# Patient Record
Sex: FEMALE | Race: WHITE | URBAN - METROPOLITAN AREA
[De-identification: names, ages, dates, MRNs, and addresses within clinical notes are randomized per-mention and may not be internally consistent; named-entity substitution may affect disease eponyms.]

---

## 2024-01-01 ENCOUNTER — APPOINTMENT (OUTPATIENT)
Dept: GENERAL RADIOLOGY | Age: 54
DRG: 064 | End: 2024-01-01
Payer: COMMERCIAL

## 2024-01-01 ENCOUNTER — APPOINTMENT (OUTPATIENT)
Dept: CT IMAGING | Age: 54
DRG: 064 | End: 2024-01-01
Payer: COMMERCIAL

## 2024-01-01 ENCOUNTER — HOSPITAL ENCOUNTER (INPATIENT)
Age: 54
LOS: 5 days | DRG: 064 | End: 2024-02-26
Attending: EMERGENCY MEDICINE | Admitting: SURGERY
Payer: COMMERCIAL

## 2024-01-01 ENCOUNTER — APPOINTMENT (OUTPATIENT)
Age: 54
DRG: 064 | End: 2024-01-01
Attending: SURGERY
Payer: COMMERCIAL

## 2024-01-01 VITALS
WEIGHT: 260.14 LBS | HEIGHT: 64 IN | DIASTOLIC BLOOD PRESSURE: 53 MMHG | HEART RATE: 71 BPM | RESPIRATION RATE: 14 BRPM | OXYGEN SATURATION: 100 % | TEMPERATURE: 98.2 F | SYSTOLIC BLOOD PRESSURE: 95 MMHG | BODY MASS INDEX: 44.41 KG/M2

## 2024-01-01 DIAGNOSIS — I61.9 INTRAPARENCHYMAL HEMORRHAGE OF BRAIN (HCC): Primary | ICD-10-CM

## 2024-01-01 LAB
AADO2: 329 MMHG
AADO2: 345.2 MMHG
AADO2: 346.5 MMHG
AADO2: 350.6 MMHG
AADO2: 360 MMHG
AADO2: 360.3 MMHG
AADO2: 361.7 MMHG
AADO2: 376.7 MMHG
AADO2: 380 MMHG
AADO2: 382.3 MMHG
AADO2: 386.4 MMHG
AADO2: 387.2 MMHG
AADO2: 409.5 MMHG
AADO2: 410.7 MMHG
AADO2: 421.2 MMHG
AADO2: 424.3 MMHG
AADO2: 425.1 MMHG
AADO2: 427.8 MMHG
AADO2: 438.9 MMHG
AADO2: 442.2 MMHG
AADO2: 458.3 MMHG
AADO2: 472.7 MMHG
AADO2: 552.1 MMHG
AADO2: 553.8 MMHG
AADO2: 56.3 MMHG
ABO + RH BLD: NORMAL
ABO + RH BLD: NORMAL
ALBUMIN SERPL-MCNC: 2.6 G/DL (ref 3.5–5.2)
ALBUMIN SERPL-MCNC: 2.8 G/DL (ref 3.5–5.2)
ALBUMIN SERPL-MCNC: 2.9 G/DL (ref 3.5–5.2)
ALBUMIN SERPL-MCNC: 3 G/DL (ref 3.5–5.2)
ALBUMIN SERPL-MCNC: 3 G/DL (ref 3.5–5.2)
ALBUMIN SERPL-MCNC: 3.2 G/DL (ref 3.5–5.2)
ALBUMIN SERPL-MCNC: 4.4 G/DL (ref 3.5–5.2)
ALP SERPL-CCNC: 49 U/L (ref 35–104)
ALP SERPL-CCNC: 50 U/L (ref 35–104)
ALP SERPL-CCNC: 52 U/L (ref 35–104)
ALP SERPL-CCNC: 56 U/L (ref 35–104)
ALP SERPL-CCNC: 58 U/L (ref 35–104)
ALP SERPL-CCNC: 59 U/L (ref 35–104)
ALP SERPL-CCNC: 73 U/L (ref 35–104)
ALP SERPL-CCNC: 82 U/L (ref 35–104)
ALP SERPL-CCNC: 85 U/L (ref 35–104)
ALP SERPL-CCNC: 97 U/L (ref 35–104)
ALT SERPL-CCNC: 16 U/L (ref 0–32)
ALT SERPL-CCNC: 17 U/L (ref 0–32)
ALT SERPL-CCNC: 17 U/L (ref 0–32)
ALT SERPL-CCNC: 19 U/L (ref 0–32)
ALT SERPL-CCNC: 20 U/L (ref 0–32)
ALT SERPL-CCNC: 7 U/L (ref 0–32)
AMYLASE SERPL-CCNC: 180 U/L (ref 20–100)
AMYLASE SERPL-CCNC: 322 U/L (ref 20–100)
AMYLASE SERPL-CCNC: 801 U/L (ref 20–100)
AMYLASE SERPL-CCNC: 96 U/L (ref 20–100)
ANION GAP SERPL CALCULATED.3IONS-SCNC: 10 MMOL/L (ref 7–16)
ANION GAP SERPL CALCULATED.3IONS-SCNC: 11 MMOL/L (ref 7–16)
ANION GAP SERPL CALCULATED.3IONS-SCNC: 11 MMOL/L (ref 7–16)
ANION GAP SERPL CALCULATED.3IONS-SCNC: 12 MMOL/L (ref 7–16)
ANION GAP SERPL CALCULATED.3IONS-SCNC: 13 MMOL/L (ref 7–16)
ANION GAP SERPL CALCULATED.3IONS-SCNC: 5 MMOL/L (ref 7–16)
ANION GAP SERPL CALCULATED.3IONS-SCNC: 7 MMOL/L (ref 7–16)
ANION GAP SERPL CALCULATED.3IONS-SCNC: 8 MMOL/L (ref 7–16)
ANION GAP SERPL CALCULATED.3IONS-SCNC: 9 MMOL/L (ref 7–16)
APAP SERPL-MCNC: <5 UG/ML (ref 10–30)
ARM BAND NUMBER: NORMAL
ARM BAND NUMBER: NORMAL
AST SERPL-CCNC: 14 U/L (ref 0–31)
AST SERPL-CCNC: 15 U/L (ref 0–31)
AST SERPL-CCNC: 16 U/L (ref 0–31)
AST SERPL-CCNC: 18 U/L (ref 0–31)
AST SERPL-CCNC: 18 U/L (ref 0–31)
AST SERPL-CCNC: 20 U/L (ref 0–31)
AST SERPL-CCNC: 21 U/L (ref 0–31)
AST SERPL-CCNC: 22 U/L (ref 0–31)
AST SERPL-CCNC: 23 U/L (ref 0–31)
AST SERPL-CCNC: 23 U/L (ref 0–31)
B.E.: -0.8 MMOL/L (ref -3–3)
B.E.: -1.9 MMOL/L (ref -3–3)
B.E.: -2.3 MMOL/L (ref -3–3)
B.E.: -2.6 MMOL/L (ref -3–3)
B.E.: -3.3 MMOL/L (ref -3–3)
B.E.: -3.4 MMOL/L (ref -3–3)
B.E.: -3.4 MMOL/L (ref -3–3)
B.E.: -3.5 MMOL/L (ref -3–3)
B.E.: -3.5 MMOL/L (ref -3–3)
B.E.: -3.8 MMOL/L (ref -3–3)
B.E.: -4.1 MMOL/L (ref -3–3)
B.E.: -4.2 MMOL/L (ref -3–3)
B.E.: -4.3 MMOL/L (ref -3–3)
B.E.: -4.5 MMOL/L (ref -3–3)
B.E.: -4.7 MMOL/L (ref -3–3)
B.E.: -4.7 MMOL/L (ref -3–3)
B.E.: -5.5 MMOL/L (ref -3–3)
B.E.: -5.5 MMOL/L (ref -3–3)
B.E.: -6 MMOL/L (ref -3–3)
B.E.: -6.1 MMOL/L (ref -3–3)
B.E.: -6.1 MMOL/L (ref -3–3)
B.E.: -6.4 MMOL/L (ref -3–3)
B.E.: -7.1 MMOL/L (ref -3–3)
BACTERIA URNS QL MICRO: ABNORMAL
BASOPHILS # BLD: 0 K/UL (ref 0–0.2)
BASOPHILS # BLD: 0.01 K/UL (ref 0–0.2)
BASOPHILS # BLD: 0.02 K/UL (ref 0–0.2)
BASOPHILS # BLD: 0.05 K/UL (ref 0–0.2)
BASOPHILS NFR BLD: 0 % (ref 0–2)
BASOPHILS NFR BLD: 1 % (ref 0–2)
BILIRUB DIRECT SERPL-MCNC: <0.2 MG/DL (ref 0–0.3)
BILIRUB INDIRECT SERPL-MCNC: ABNORMAL MG/DL (ref 0–1)
BILIRUB SERPL-MCNC: 0.2 MG/DL (ref 0–1.2)
BILIRUB SERPL-MCNC: 0.3 MG/DL (ref 0–1.2)
BILIRUB SERPL-MCNC: 0.3 MG/DL (ref 0–1.2)
BILIRUB SERPL-MCNC: 0.4 MG/DL (ref 0–1.2)
BILIRUB UR QL STRIP: NEGATIVE
BLOOD BANK SAMPLE EXPIRATION: NORMAL
BLOOD BANK SAMPLE EXPIRATION: NORMAL
BLOOD GROUP ANTIBODIES SERPL: NEGATIVE
BLOOD GROUP ANTIBODIES SERPL: NEGATIVE
BUN SERPL-MCNC: 19 MG/DL (ref 6–20)
BUN SERPL-MCNC: 20 MG/DL (ref 6–20)
BUN SERPL-MCNC: 21 MG/DL (ref 6–20)
BUN SERPL-MCNC: 22 MG/DL (ref 6–20)
BUN SERPL-MCNC: 22 MG/DL (ref 6–20)
BUN SERPL-MCNC: 23 MG/DL (ref 6–20)
BUN SERPL-MCNC: 24 MG/DL (ref 6–20)
BUN SERPL-MCNC: 25 MG/DL (ref 6–20)
BUN SERPL-MCNC: 26 MG/DL (ref 6–20)
BUN SERPL-MCNC: 27 MG/DL (ref 6–20)
BUN SERPL-MCNC: 28 MG/DL (ref 6–20)
BUN SERPL-MCNC: 29 MG/DL (ref 6–20)
BUN SERPL-MCNC: 29 MG/DL (ref 6–20)
CA-I BLD-SCNC: 1 MMOL/L (ref 1.15–1.33)
CA-I BLD-SCNC: 1.14 MMOL/L (ref 1.15–1.33)
CA-I BLD-SCNC: 1.16 MMOL/L (ref 1.15–1.33)
CA-I BLD-SCNC: 1.16 MMOL/L (ref 1.15–1.33)
CA-I BLD-SCNC: 1.17 MMOL/L (ref 1.15–1.33)
CA-I BLD-SCNC: 1.18 MMOL/L (ref 1.15–1.33)
CA-I BLD-SCNC: 1.18 MMOL/L (ref 1.15–1.33)
CA-I BLD-SCNC: 1.19 MMOL/L (ref 1.15–1.33)
CA-I BLD-SCNC: 1.21 MMOL/L (ref 1.15–1.33)
CA-I BLD-SCNC: 1.23 MMOL/L (ref 1.15–1.33)
CA-I BLD-SCNC: 1.23 MMOL/L (ref 1.15–1.33)
CA-I BLD-SCNC: 1.24 MMOL/L (ref 1.15–1.33)
CALCIUM SERPL-MCNC: 7.8 MG/DL (ref 8.6–10.2)
CALCIUM SERPL-MCNC: 7.9 MG/DL (ref 8.6–10.2)
CALCIUM SERPL-MCNC: 8 MG/DL (ref 8.6–10.2)
CALCIUM SERPL-MCNC: 8.1 MG/DL (ref 8.6–10.2)
CALCIUM SERPL-MCNC: 8.2 MG/DL (ref 8.6–10.2)
CALCIUM SERPL-MCNC: 8.2 MG/DL (ref 8.6–10.2)
CALCIUM SERPL-MCNC: 8.3 MG/DL (ref 8.6–10.2)
CALCIUM SERPL-MCNC: 8.4 MG/DL (ref 8.6–10.2)
CALCIUM SERPL-MCNC: 8.6 MG/DL (ref 8.6–10.2)
CALCIUM SERPL-MCNC: 9 MG/DL (ref 8.6–10.2)
CASTS #/AREA URNS LPF: ABNORMAL /LPF
CHLORIDE SERPL-SCNC: 103 MMOL/L (ref 98–107)
CHLORIDE SERPL-SCNC: 112 MMOL/L (ref 98–107)
CHLORIDE SERPL-SCNC: 116 MMOL/L (ref 98–107)
CHLORIDE SERPL-SCNC: 118 MMOL/L (ref 98–107)
CHLORIDE SERPL-SCNC: 120 MMOL/L (ref 98–107)
CHLORIDE SERPL-SCNC: 122 MMOL/L (ref 98–107)
CHLORIDE SERPL-SCNC: 124 MMOL/L (ref 98–107)
CHLORIDE SERPL-SCNC: 126 MMOL/L (ref 98–107)
CHLORIDE SERPL-SCNC: 127 MMOL/L (ref 98–107)
CHLORIDE SERPL-SCNC: 130 MMOL/L (ref 98–107)
CHLORIDE SERPL-SCNC: 130 MMOL/L (ref 98–107)
CHLORIDE SERPL-SCNC: 132 MMOL/L (ref 98–107)
CHLORIDE SERPL-SCNC: 133 MMOL/L (ref 98–107)
CHLORIDE SERPL-SCNC: 134 MMOL/L (ref 98–107)
CHLORIDE SERPL-SCNC: 136 MMOL/L (ref 98–107)
CHLORIDE SERPL-SCNC: 137 MMOL/L (ref 98–107)
CK SERPL-CCNC: 272 U/L (ref 20–180)
CLARITY UR: ABNORMAL
CLARITY UR: ABNORMAL
CLARITY UR: CLEAR
CLARITY UR: CLEAR
CLOT ANGLE.KAOLIN INDUCED BLD RES TEG: 74.4 DEG (ref 53–70)
CO2 SERPL-SCNC: 18 MMOL/L (ref 22–29)
CO2 SERPL-SCNC: 19 MMOL/L (ref 22–29)
CO2 SERPL-SCNC: 20 MMOL/L (ref 22–29)
CO2 SERPL-SCNC: 21 MMOL/L (ref 22–29)
CO2 SERPL-SCNC: 22 MMOL/L (ref 22–29)
CO2 SERPL-SCNC: 22 MMOL/L (ref 22–29)
CO2 SERPL-SCNC: 23 MMOL/L (ref 22–29)
CO2 SERPL-SCNC: 24 MMOL/L (ref 22–29)
COHB: 0.2 % (ref 0–1.5)
COHB: 0.3 % (ref 0–1.5)
COHB: 0.7 % (ref 0–1.5)
COLOR UR: YELLOW
COMMENT: ABNORMAL
CORTIS SERPL-MCNC: 10 UG/DL (ref 2.7–18.4)
CORTISOL COLLECTION INFO: NORMAL
CREAT SERPL-MCNC: 0.9 MG/DL (ref 0.5–1)
CREAT SERPL-MCNC: 0.9 MG/DL (ref 0.5–1)
CREAT SERPL-MCNC: 1.1 MG/DL (ref 0.5–1)
CREAT SERPL-MCNC: 1.2 MG/DL (ref 0.5–1)
CREAT SERPL-MCNC: 1.3 MG/DL (ref 0.5–1)
CRITICAL: ABNORMAL
DATE ANALYZED: ABNORMAL
DATE OF COLLECTION: ABNORMAL
ECHO AO ASC DIAM: 2.9 CM
ECHO AO ASCENDING AORTA INDEX: 1.32 CM/M2
ECHO AV AREA PEAK VELOCITY: 3.7 CM2
ECHO AV AREA VTI: 3.7 CM2
ECHO AV AREA/BSA PEAK VELOCITY: 1.7 CM2/M2
ECHO AV AREA/BSA VTI: 1.7 CM2/M2
ECHO AV CUSP MM: 1.8 CM
ECHO AV MEAN GRADIENT: 15 MMHG
ECHO AV MEAN VELOCITY: 1.9 M/S
ECHO AV PEAK GRADIENT: 24 MMHG
ECHO AV PEAK VELOCITY: 2.4 M/S
ECHO AV VELOCITY RATIO: 1.04
ECHO AV VTI: 38.3 CM
ECHO BSA: 2.23 M2
ECHO EST RA PRESSURE: 3 MMHG
ECHO LA DIAMETER INDEX: 1.87 CM/M2
ECHO LA DIAMETER: 4.1 CM
ECHO LA VOL A-L A2C: 54 ML (ref 22–52)
ECHO LA VOL A-L A4C: 47 ML (ref 22–52)
ECHO LA VOL MOD A2C: 51 ML (ref 22–52)
ECHO LA VOL MOD A4C: 44 ML (ref 22–52)
ECHO LA VOLUME AREA LENGTH: 50 ML
ECHO LA VOLUME INDEX A-L A2C: 25 ML/M2 (ref 16–34)
ECHO LA VOLUME INDEX A-L A4C: 21 ML/M2 (ref 16–34)
ECHO LA VOLUME INDEX AREA LENGTH: 23 ML/M2 (ref 16–34)
ECHO LA VOLUME INDEX MOD A2C: 23 ML/M2 (ref 16–34)
ECHO LA VOLUME INDEX MOD A4C: 20 ML/M2 (ref 16–34)
ECHO LV FRACTIONAL SHORTENING: 38 % (ref 28–44)
ECHO LV INTERNAL DIMENSION DIASTOLE INDEX: 1.92 CM/M2
ECHO LV INTERNAL DIMENSION DIASTOLIC: 4.2 CM (ref 3.9–5.3)
ECHO LV INTERNAL DIMENSION SYSTOLIC INDEX: 1.19 CM/M2
ECHO LV INTERNAL DIMENSION SYSTOLIC: 2.6 CM
ECHO LV IVSD: 1.8 CM (ref 0.6–0.9)
ECHO LV MASS 2D: 290 G (ref 67–162)
ECHO LV MASS INDEX 2D: 132.4 G/M2 (ref 43–95)
ECHO LV POSTERIOR WALL DIASTOLIC: 1.5 CM (ref 0.6–0.9)
ECHO LV RELATIVE WALL THICKNESS RATIO: 0.71
ECHO LVOT AREA: 3.8 CM2
ECHO LVOT AV VTI INDEX: 0.99
ECHO LVOT DIAM: 2.2 CM
ECHO LVOT MEAN GRADIENT: 13 MMHG
ECHO LVOT PEAK GRADIENT: 24 MMHG
ECHO LVOT PEAK VELOCITY: 2.5 M/S
ECHO LVOT STROKE VOLUME INDEX: 66.1 ML/M2
ECHO LVOT SV: 144.8 ML
ECHO LVOT VTI: 38.1 CM
ECHO MV "A" WAVE DURATION: 110.7 MSEC
ECHO MV A VELOCITY: 0.86 M/S
ECHO MV AREA PHT: 3.5 CM2
ECHO MV AREA VTI: 4.2 CM2
ECHO MV E DECELERATION TIME (DT): 202.3 MS
ECHO MV E VELOCITY: 0.83 M/S
ECHO MV E/A RATIO: 0.97
ECHO MV LVOT VTI INDEX: 0.91
ECHO MV MAX VELOCITY: 1.4 M/S
ECHO MV MEAN GRADIENT: 3 MMHG
ECHO MV MEAN VELOCITY: 0.9 M/S
ECHO MV PEAK GRADIENT: 7 MMHG
ECHO MV PRESSURE HALF TIME (PHT): 62.6 MS
ECHO MV VTI: 34.8 CM
ECHO RIGHT VENTRICULAR SYSTOLIC PRESSURE (RVSP): 49 MMHG
ECHO RV INTERNAL DIMENSION: 2.9 CM
ECHO TV REGURGITANT MAX VELOCITY: 3.39 M/S
ECHO TV REGURGITANT PEAK GRADIENT: 46 MMHG
EOSINOPHIL # BLD: 0 K/UL (ref 0.05–0.5)
EOSINOPHIL # BLD: 0.02 K/UL (ref 0.05–0.5)
EOSINOPHIL # BLD: 0.06 K/UL (ref 0.05–0.5)
EOSINOPHIL # BLD: 0.09 K/UL (ref 0.05–0.5)
EOSINOPHIL # BLD: 0.11 K/UL (ref 0.05–0.5)
EOSINOPHIL # BLD: 0.12 K/UL (ref 0.05–0.5)
EOSINOPHIL # BLD: 0.12 K/UL (ref 0.05–0.5)
EOSINOPHIL # BLD: 0.16 K/UL (ref 0.05–0.5)
EOSINOPHIL # BLD: 0.19 K/UL (ref 0.05–0.5)
EOSINOPHILS RELATIVE PERCENT: 0 % (ref 0–6)
EOSINOPHILS RELATIVE PERCENT: 1 % (ref 0–6)
EOSINOPHILS RELATIVE PERCENT: 1 % (ref 0–6)
EOSINOPHILS RELATIVE PERCENT: 2 % (ref 0–6)
EPL-TEG: 0 % (ref 0–15)
ERYTHROCYTE [DISTWIDTH] IN BLOOD BY AUTOMATED COUNT: 14.6 % (ref 11.5–15)
ERYTHROCYTE [DISTWIDTH] IN BLOOD BY AUTOMATED COUNT: 15 % (ref 11.5–15)
ERYTHROCYTE [DISTWIDTH] IN BLOOD BY AUTOMATED COUNT: 15.8 % (ref 11.5–15)
ERYTHROCYTE [DISTWIDTH] IN BLOOD BY AUTOMATED COUNT: 16.1 % (ref 11.5–15)
ERYTHROCYTE [DISTWIDTH] IN BLOOD BY AUTOMATED COUNT: 16.1 % (ref 11.5–15)
ERYTHROCYTE [DISTWIDTH] IN BLOOD BY AUTOMATED COUNT: 16.4 % (ref 11.5–15)
ERYTHROCYTE [DISTWIDTH] IN BLOOD BY AUTOMATED COUNT: 16.6 % (ref 11.5–15)
ERYTHROCYTE [DISTWIDTH] IN BLOOD BY AUTOMATED COUNT: 16.8 % (ref 11.5–15)
ERYTHROCYTE [DISTWIDTH] IN BLOOD BY AUTOMATED COUNT: 16.9 % (ref 11.5–15)
ERYTHROCYTE [DISTWIDTH] IN BLOOD BY AUTOMATED COUNT: 17 % (ref 11.5–15)
ERYTHROCYTE [DISTWIDTH] IN BLOOD BY AUTOMATED COUNT: 17.1 % (ref 11.5–15)
ERYTHROCYTE [DISTWIDTH] IN BLOOD BY AUTOMATED COUNT: 17.1 % (ref 11.5–15)
ETHANOLAMINE SERPL-MCNC: <10 MG/DL
FIO2: 100 %
FIO2: 40 %
FIO2: 80 %
FIO2: 90 %
G-TEG: 15.1 KDYN/CM2 (ref 4.5–11)
GFR SERPL CREATININE-BSD FRML MDRD: 50 ML/MIN/1.73M2
GFR SERPL CREATININE-BSD FRML MDRD: 51 ML/MIN/1.73M2
GFR SERPL CREATININE-BSD FRML MDRD: 52 ML/MIN/1.73M2
GFR SERPL CREATININE-BSD FRML MDRD: 53 ML/MIN/1.73M2
GFR SERPL CREATININE-BSD FRML MDRD: 53 ML/MIN/1.73M2
GFR SERPL CREATININE-BSD FRML MDRD: 55 ML/MIN/1.73M2
GFR SERPL CREATININE-BSD FRML MDRD: 56 ML/MIN/1.73M2
GFR SERPL CREATININE-BSD FRML MDRD: 57 ML/MIN/1.73M2
GFR SERPL CREATININE-BSD FRML MDRD: 58 ML/MIN/1.73M2
GFR SERPL CREATININE-BSD FRML MDRD: 58 ML/MIN/1.73M2
GFR SERPL CREATININE-BSD FRML MDRD: 59 ML/MIN/1.73M2
GFR SERPL CREATININE-BSD FRML MDRD: 60 ML/MIN/1.73M2
GFR SERPL CREATININE-BSD FRML MDRD: >60 ML/MIN/1.73M2
GLUCOSE SERPL-MCNC: 110 MG/DL (ref 74–99)
GLUCOSE SERPL-MCNC: 113 MG/DL (ref 74–99)
GLUCOSE SERPL-MCNC: 115 MG/DL (ref 74–99)
GLUCOSE SERPL-MCNC: 118 MG/DL (ref 74–99)
GLUCOSE SERPL-MCNC: 120 MG/DL (ref 74–99)
GLUCOSE SERPL-MCNC: 122 MG/DL (ref 74–99)
GLUCOSE SERPL-MCNC: 126 MG/DL (ref 74–99)
GLUCOSE SERPL-MCNC: 129 MG/DL (ref 74–99)
GLUCOSE SERPL-MCNC: 129 MG/DL (ref 74–99)
GLUCOSE SERPL-MCNC: 130 MG/DL (ref 74–99)
GLUCOSE SERPL-MCNC: 130 MG/DL (ref 74–99)
GLUCOSE SERPL-MCNC: 132 MG/DL (ref 74–99)
GLUCOSE SERPL-MCNC: 132 MG/DL (ref 74–99)
GLUCOSE SERPL-MCNC: 134 MG/DL (ref 74–99)
GLUCOSE SERPL-MCNC: 136 MG/DL (ref 74–99)
GLUCOSE SERPL-MCNC: 137 MG/DL (ref 74–99)
GLUCOSE SERPL-MCNC: 138 MG/DL (ref 74–99)
GLUCOSE SERPL-MCNC: 138 MG/DL (ref 74–99)
GLUCOSE SERPL-MCNC: 143 MG/DL (ref 74–99)
GLUCOSE SERPL-MCNC: 145 MG/DL (ref 74–99)
GLUCOSE SERPL-MCNC: 160 MG/DL (ref 74–99)
GLUCOSE UR STRIP-MCNC: NEGATIVE MG/DL
HBA1C MFR BLD: 5.6 % (ref 4–5.6)
HCO3: 18.5 MMOL/L (ref 22–26)
HCO3: 18.8 MMOL/L (ref 22–26)
HCO3: 19 MMOL/L (ref 22–26)
HCO3: 19.4 MMOL/L (ref 22–26)
HCO3: 19.4 MMOL/L (ref 22–26)
HCO3: 19.5 MMOL/L (ref 22–26)
HCO3: 19.6 MMOL/L (ref 22–26)
HCO3: 19.8 MMOL/L (ref 22–26)
HCO3: 19.8 MMOL/L (ref 22–26)
HCO3: 20 MMOL/L (ref 22–26)
HCO3: 20 MMOL/L (ref 22–26)
HCO3: 20.2 MMOL/L (ref 22–26)
HCO3: 20.2 MMOL/L (ref 22–26)
HCO3: 20.3 MMOL/L (ref 22–26)
HCO3: 20.8 MMOL/L (ref 22–26)
HCO3: 21.2 MMOL/L (ref 22–26)
HCO3: 21.2 MMOL/L (ref 22–26)
HCO3: 21.8 MMOL/L (ref 22–26)
HCO3: 21.8 MMOL/L (ref 22–26)
HCO3: 22.2 MMOL/L (ref 22–26)
HCO3: 22.2 MMOL/L (ref 22–26)
HCO3: 22.8 MMOL/L (ref 22–26)
HCO3: 22.8 MMOL/L (ref 22–26)
HCO3: 23.3 MMOL/L (ref 22–26)
HCO3: 24.2 MMOL/L (ref 22–26)
HCT VFR BLD AUTO: 25.6 % (ref 34–48)
HCT VFR BLD AUTO: 25.9 % (ref 34–48)
HCT VFR BLD AUTO: 26.3 % (ref 34–48)
HCT VFR BLD AUTO: 27.2 % (ref 34–48)
HCT VFR BLD AUTO: 28.3 % (ref 34–48)
HCT VFR BLD AUTO: 29.1 % (ref 34–48)
HCT VFR BLD AUTO: 29.6 % (ref 34–48)
HCT VFR BLD AUTO: 30.7 % (ref 34–48)
HCT VFR BLD AUTO: 32.5 % (ref 34–48)
HCT VFR BLD AUTO: 32.8 % (ref 34–48)
HCT VFR BLD AUTO: 35.5 % (ref 34–48)
HCT VFR BLD AUTO: 40.8 % (ref 34–48)
HGB BLD-MCNC: 10.6 G/DL (ref 11.5–15.5)
HGB BLD-MCNC: 11.8 G/DL (ref 11.5–15.5)
HGB BLD-MCNC: 13.3 G/DL (ref 11.5–15.5)
HGB BLD-MCNC: 7.8 G/DL (ref 11.5–15.5)
HGB BLD-MCNC: 7.9 G/DL (ref 11.5–15.5)
HGB BLD-MCNC: 8.1 G/DL (ref 11.5–15.5)
HGB BLD-MCNC: 8.3 G/DL (ref 11.5–15.5)
HGB BLD-MCNC: 8.7 G/DL (ref 11.5–15.5)
HGB BLD-MCNC: 8.8 G/DL (ref 11.5–15.5)
HGB BLD-MCNC: 9.1 G/DL (ref 11.5–15.5)
HGB BLD-MCNC: 9.5 G/DL (ref 11.5–15.5)
HGB BLD-MCNC: 9.8 G/DL (ref 11.5–15.5)
HGB UR QL STRIP.AUTO: ABNORMAL
HGB UR QL STRIP.AUTO: ABNORMAL
HGB UR QL STRIP.AUTO: NEGATIVE
HGB UR QL STRIP.AUTO: NEGATIVE
HHB: 0.5 % (ref 0–5)
HHB: 1.1 % (ref 0–5)
HHB: 1.2 % (ref 0–5)
HHB: 1.3 % (ref 0–5)
HHB: 1.4 % (ref 0–5)
HHB: 1.5 % (ref 0–5)
HHB: 1.7 % (ref 0–5)
HHB: 1.8 % (ref 0–5)
HHB: 1.9 % (ref 0–5)
HHB: 2 % (ref 0–5)
HHB: 2.1 % (ref 0–5)
HHB: 2.3 % (ref 0–5)
HHB: 2.3 % (ref 0–5)
HHB: 3.2 % (ref 0–5)
HHB: 3.7 % (ref 0–5)
I/E RATIO: ABNORMAL
IMM GRANULOCYTES # BLD AUTO: 0.03 K/UL (ref 0–0.58)
IMM GRANULOCYTES # BLD AUTO: 0.04 K/UL (ref 0–0.58)
IMM GRANULOCYTES # BLD AUTO: 0.04 K/UL (ref 0–0.58)
IMM GRANULOCYTES # BLD AUTO: 0.05 K/UL (ref 0–0.58)
IMM GRANULOCYTES # BLD AUTO: <0.03 K/UL (ref 0–0.58)
IMM GRANULOCYTES # BLD AUTO: <0.03 K/UL (ref 0–0.58)
IMM GRANULOCYTES NFR BLD: 0 % (ref 0–5)
IMM GRANULOCYTES NFR BLD: 1 % (ref 0–5)
INR PPP: 1
INR PPP: 1.1
INR PPP: 1.2
INR PPP: 1.2
INR PPP: 1.3
KETONES UR STRIP-MCNC: ABNORMAL MG/DL
KETONES UR STRIP-MCNC: NEGATIVE MG/DL
KINETICS TEG: 0.9 MIN (ref 1–3)
LAB: ABNORMAL
LACTATE BLDV-SCNC: 0.5 MMOL/L (ref 0.5–2.2)
LACTATE BLDV-SCNC: 0.6 MMOL/L (ref 0.5–2.2)
LACTATE BLDV-SCNC: 0.7 MMOL/L (ref 0.5–2.2)
LACTATE BLDV-SCNC: 0.8 MMOL/L (ref 0.5–2.2)
LACTATE BLDV-SCNC: 0.9 MMOL/L (ref 0.5–2.2)
LACTATE BLDV-SCNC: 1.1 MMOL/L (ref 0.5–2.2)
LACTATE BLDV-SCNC: 1.5 MMOL/L (ref 0.5–2.2)
LEUKOCYTE ESTERASE UR QL STRIP: NEGATIVE
LIPASE SERPL-CCNC: 14 U/L (ref 13–60)
LIPASE SERPL-CCNC: 15 U/L (ref 13–60)
LIPASE SERPL-CCNC: 16 U/L (ref 13–60)
LIPASE SERPL-CCNC: 21 U/L (ref 13–60)
LY30 (LYSIS) TEG: 0 % (ref 0–8)
LYMPHOCYTES NFR BLD: 0.22 K/UL (ref 1.5–4)
LYMPHOCYTES NFR BLD: 0.62 K/UL (ref 1.5–4)
LYMPHOCYTES NFR BLD: 0.64 K/UL (ref 1.5–4)
LYMPHOCYTES NFR BLD: 0.68 K/UL (ref 1.5–4)
LYMPHOCYTES NFR BLD: 0.75 K/UL (ref 1.5–4)
LYMPHOCYTES NFR BLD: 0.97 K/UL (ref 1.5–4)
LYMPHOCYTES NFR BLD: 1.03 K/UL (ref 1.5–4)
LYMPHOCYTES NFR BLD: 1.04 K/UL (ref 1.5–4)
LYMPHOCYTES NFR BLD: 1.27 K/UL (ref 1.5–4)
LYMPHOCYTES NFR BLD: 1.63 K/UL (ref 1.5–4)
LYMPHOCYTES RELATIVE PERCENT: 11 % (ref 20–42)
LYMPHOCYTES RELATIVE PERCENT: 12 % (ref 20–42)
LYMPHOCYTES RELATIVE PERCENT: 13 % (ref 20–42)
LYMPHOCYTES RELATIVE PERCENT: 16 % (ref 20–42)
LYMPHOCYTES RELATIVE PERCENT: 17 % (ref 20–42)
LYMPHOCYTES RELATIVE PERCENT: 18 % (ref 20–42)
LYMPHOCYTES RELATIVE PERCENT: 19 % (ref 20–42)
LYMPHOCYTES RELATIVE PERCENT: 4 % (ref 20–42)
LYMPHOCYTES RELATIVE PERCENT: 6 % (ref 20–42)
LYMPHOCYTES RELATIVE PERCENT: 7 % (ref 20–42)
LYMPHOCYTES RELATIVE PERCENT: 9 % (ref 20–42)
Lab: 1020
Lab: 1026
Lab: 1346
Lab: 1350
Lab: 1405
Lab: 1410
Lab: 1422
Lab: 150
Lab: 1600
Lab: 1815
Lab: 1832
Lab: 1846
Lab: 2105
Lab: 215
Lab: 220
Lab: 2200
Lab: 2205
Lab: 440
Lab: 50
Lab: 522
Lab: 544
Lab: 600
Lab: 610
Lab: 838
Lab: 930
MA (MAX CLOT) TEG: 75.1 MM (ref 50–70)
MAGNESIUM SERPL-MCNC: 1.8 MG/DL (ref 1.6–2.6)
MAGNESIUM SERPL-MCNC: 1.9 MG/DL (ref 1.6–2.6)
MAGNESIUM SERPL-MCNC: 2 MG/DL (ref 1.6–2.6)
MAGNESIUM SERPL-MCNC: 2.1 MG/DL (ref 1.6–2.6)
MAGNESIUM SERPL-MCNC: 2.1 MG/DL (ref 1.6–2.6)
MAGNESIUM SERPL-MCNC: 2.2 MG/DL (ref 1.6–2.6)
MAGNESIUM SERPL-MCNC: 2.3 MG/DL (ref 1.6–2.6)
MAGNESIUM SERPL-MCNC: 2.4 MG/DL (ref 1.6–2.6)
MAGNESIUM SERPL-MCNC: 2.6 MG/DL (ref 1.6–2.6)
MAGNESIUM SERPL-MCNC: 2.6 MG/DL (ref 1.6–2.6)
MCH RBC QN AUTO: 27.6 PG (ref 26–35)
MCH RBC QN AUTO: 27.8 PG (ref 26–35)
MCH RBC QN AUTO: 28.1 PG (ref 26–35)
MCH RBC QN AUTO: 28.2 PG (ref 26–35)
MCH RBC QN AUTO: 28.3 PG (ref 26–35)
MCH RBC QN AUTO: 28.4 PG (ref 26–35)
MCH RBC QN AUTO: 28.5 PG (ref 26–35)
MCH RBC QN AUTO: 28.6 PG (ref 26–35)
MCH RBC QN AUTO: 28.6 PG (ref 26–35)
MCH RBC QN AUTO: 29 PG (ref 26–35)
MCHC RBC AUTO-ENTMCNC: 29.9 G/DL (ref 32–34.5)
MCHC RBC AUTO-ENTMCNC: 30.2 G/DL (ref 32–34.5)
MCHC RBC AUTO-ENTMCNC: 30.5 G/DL (ref 32–34.5)
MCHC RBC AUTO-ENTMCNC: 30.7 G/DL (ref 32–34.5)
MCHC RBC AUTO-ENTMCNC: 30.8 G/DL (ref 32–34.5)
MCHC RBC AUTO-ENTMCNC: 30.9 G/DL (ref 32–34.5)
MCHC RBC AUTO-ENTMCNC: 31.1 G/DL (ref 32–34.5)
MCHC RBC AUTO-ENTMCNC: 32.3 G/DL (ref 32–34.5)
MCHC RBC AUTO-ENTMCNC: 32.6 G/DL (ref 32–34.5)
MCHC RBC AUTO-ENTMCNC: 33.2 G/DL (ref 32–34.5)
MCV RBC AUTO: 85.3 FL (ref 80–99.9)
MCV RBC AUTO: 87 FL (ref 80–99.9)
MCV RBC AUTO: 89.9 FL (ref 80–99.9)
MCV RBC AUTO: 91.3 FL (ref 80–99.9)
MCV RBC AUTO: 91.9 FL (ref 80–99.9)
MCV RBC AUTO: 92.2 FL (ref 80–99.9)
MCV RBC AUTO: 92.3 FL (ref 80–99.9)
MCV RBC AUTO: 92.4 FL (ref 80–99.9)
MCV RBC AUTO: 92.4 FL (ref 80–99.9)
MCV RBC AUTO: 92.5 FL (ref 80–99.9)
MCV RBC AUTO: 93.2 FL (ref 80–99.9)
MCV RBC AUTO: 93.5 FL (ref 80–99.9)
METHB: 0.1 % (ref 0–1.5)
METHB: 0.2 % (ref 0–1.5)
METHB: 0.3 % (ref 0–1.5)
METHB: 0.4 % (ref 0–1.5)
METHB: 0.5 % (ref 0–1.5)
MICROORGANISM SPEC CULT: ABNORMAL
MICROORGANISM SPEC CULT: NORMAL
MICROORGANISM/AGENT SPEC: ABNORMAL
MODE: ABNORMAL
MODE: ABNORMAL
MODE: AC
MONOCYTES NFR BLD: 0.22 K/UL (ref 0.1–0.95)
MONOCYTES NFR BLD: 0.56 K/UL (ref 0.1–0.95)
MONOCYTES NFR BLD: 0.57 K/UL (ref 0.1–0.95)
MONOCYTES NFR BLD: 0.62 K/UL (ref 0.1–0.95)
MONOCYTES NFR BLD: 0.83 K/UL (ref 0.1–0.95)
MONOCYTES NFR BLD: 0.84 K/UL (ref 0.1–0.95)
MONOCYTES NFR BLD: 0.86 K/UL (ref 0.1–0.95)
MONOCYTES NFR BLD: 0.93 K/UL (ref 0.1–0.95)
MONOCYTES NFR BLD: 1.03 K/UL (ref 0.1–0.95)
MONOCYTES NFR BLD: 1.05 K/UL (ref 0.1–0.95)
MONOCYTES NFR BLD: 1.06 K/UL (ref 0.1–0.95)
MONOCYTES NFR BLD: 10 % (ref 2–12)
MONOCYTES NFR BLD: 11 % (ref 2–12)
MONOCYTES NFR BLD: 11 % (ref 2–12)
MONOCYTES NFR BLD: 12 % (ref 2–12)
MONOCYTES NFR BLD: 14 % (ref 2–12)
MONOCYTES NFR BLD: 14 % (ref 2–12)
MONOCYTES NFR BLD: 4 % (ref 2–12)
MONOCYTES NFR BLD: 6 % (ref 2–12)
NEUTROPHILS NFR BLD: 66 % (ref 43–80)
NEUTROPHILS NFR BLD: 68 % (ref 43–80)
NEUTROPHILS NFR BLD: 68 % (ref 43–80)
NEUTROPHILS NFR BLD: 70 % (ref 43–80)
NEUTROPHILS NFR BLD: 73 % (ref 43–80)
NEUTROPHILS NFR BLD: 74 % (ref 43–80)
NEUTROPHILS NFR BLD: 77 % (ref 43–80)
NEUTROPHILS NFR BLD: 77 % (ref 43–80)
NEUTROPHILS NFR BLD: 83 % (ref 43–80)
NEUTROPHILS NFR BLD: 87 % (ref 43–80)
NEUTROPHILS NFR BLD: 90 % (ref 43–80)
NEUTS SEG NFR BLD: 3.64 K/UL (ref 1.8–7.3)
NEUTS SEG NFR BLD: 3.96 K/UL (ref 1.8–7.3)
NEUTS SEG NFR BLD: 4.09 K/UL (ref 1.8–7.3)
NEUTS SEG NFR BLD: 4.64 K/UL (ref 1.8–7.3)
NEUTS SEG NFR BLD: 4.86 K/UL (ref 1.8–7.3)
NEUTS SEG NFR BLD: 5.43 K/UL (ref 1.8–7.3)
NEUTS SEG NFR BLD: 5.61 K/UL (ref 1.8–7.3)
NEUTS SEG NFR BLD: 5.7 K/UL (ref 1.8–7.3)
NEUTS SEG NFR BLD: 6.79 K/UL (ref 1.8–7.3)
NEUTS SEG NFR BLD: 8.85 K/UL (ref 1.8–7.3)
NEUTS SEG NFR BLD: 9.17 K/UL (ref 1.8–7.3)
NITRITE UR QL STRIP: NEGATIVE
O2 CONTENT: 20.1 ML/DL
O2 SATURATION: 96.3 % (ref 92–98.5)
O2 SATURATION: 96.8 % (ref 92–98.5)
O2 SATURATION: 97.7 % (ref 92–98.5)
O2 SATURATION: 97.7 % (ref 92–98.5)
O2 SATURATION: 97.9 % (ref 92–98.5)
O2 SATURATION: 98 % (ref 92–98.5)
O2 SATURATION: 98.1 % (ref 92–98.5)
O2 SATURATION: 98.2 % (ref 92–98.5)
O2 SATURATION: 98.3 % (ref 92–98.5)
O2 SATURATION: 98.5 % (ref 92–98.5)
O2 SATURATION: 98.6 % (ref 92–98.5)
O2 SATURATION: 98.7 % (ref 92–98.5)
O2 SATURATION: 98.8 % (ref 92–98.5)
O2 SATURATION: 98.9 % (ref 92–98.5)
O2 SATURATION: 99.5 % (ref 92–98.5)
O2HB: 95.8 % (ref 94–97)
O2HB: 96.2 % (ref 94–97)
O2HB: 97.2 % (ref 94–97)
O2HB: 97.3 % (ref 94–97)
O2HB: 97.4 % (ref 94–97)
O2HB: 97.5 % (ref 94–97)
O2HB: 97.6 % (ref 94–97)
O2HB: 97.6 % (ref 94–97)
O2HB: 97.8 % (ref 94–97)
O2HB: 97.9 % (ref 94–97)
O2HB: 98 % (ref 94–97)
O2HB: 98.1 % (ref 94–97)
O2HB: 98.2 % (ref 94–97)
O2HB: 98.2 % (ref 94–97)
O2HB: 98.3 % (ref 94–97)
O2HB: 98.4 % (ref 94–97)
O2HB: 98.6 % (ref 94–97)
OPERATOR ID: 1868
OPERATOR ID: 1893
OPERATOR ID: 1893
OPERATOR ID: 2577
OPERATOR ID: 2863
OPERATOR ID: 366
OPERATOR ID: 405
OPERATOR ID: 914
OPERATOR ID: ABNORMAL
OSMOLALITY UR: 481 MOSM/KG (ref 300–900)
PARTIAL THROMBOPLASTIN TIME: 29.4 SEC (ref 24.5–35.1)
PARTIAL THROMBOPLASTIN TIME: 30 SEC (ref 24.5–35.1)
PARTIAL THROMBOPLASTIN TIME: 30.6 SEC (ref 24.5–35.1)
PARTIAL THROMBOPLASTIN TIME: 30.7 SEC (ref 24.5–35.1)
PARTIAL THROMBOPLASTIN TIME: 32.8 SEC (ref 24.5–35.1)
PARTIAL THROMBOPLASTIN TIME: 34.1 SEC (ref 24.5–35.1)
PARTIAL THROMBOPLASTIN TIME: 35.5 SEC (ref 24.5–35.1)
PATIENT TEMP: 37 C
PCO2: 29 MMHG (ref 35–45)
PCO2: 29.2 MMHG (ref 35–45)
PCO2: 31.6 MMHG (ref 35–45)
PCO2: 33.4 MMHG (ref 35–45)
PCO2: 35 MMHG (ref 35–45)
PCO2: 35.4 MMHG (ref 35–45)
PCO2: 35.9 MMHG (ref 35–45)
PCO2: 36.4 MMHG (ref 35–45)
PCO2: 37.9 MMHG (ref 35–45)
PCO2: 38.3 MMHG (ref 35–45)
PCO2: 38.6 MMHG (ref 35–45)
PCO2: 39 MMHG (ref 35–45)
PCO2: 40 MMHG (ref 35–45)
PCO2: 40.1 MMHG (ref 35–45)
PCO2: 40.3 MMHG (ref 35–45)
PCO2: 40.9 MMHG (ref 35–45)
PCO2: 41.3 MMHG (ref 35–45)
PCO2: 41.8 MMHG (ref 35–45)
PCO2: 41.8 MMHG (ref 35–45)
PCO2: 42.3 MMHG (ref 35–45)
PCO2: 42.4 MMHG (ref 35–45)
PCO2: 43.3 MMHG (ref 35–45)
PCO2: 55.3 MMHG (ref 35–45)
PEEP/CPAP: 1 CMH2O
PEEP/CPAP: 10 CMH2O
PEEP/CPAP: 14 CMH2O
PEEP/CPAP: 8 CMH2O
PFO2: 0.99 MMHG/%
PFO2: 1 MMHG/%
PFO2: 1.55 MMHG/%
PFO2: 1.64 MMHG/%
PFO2: 1.65 MMHG/%
PFO2: 1.69 MMHG/%
PFO2: 1.7 MMHG/%
PFO2: 1.71 MMHG/%
PFO2: 1.81 MMHG/%
PFO2: 1.84 MMHG/%
PFO2: 1.86 MMHG/%
PFO2: 1.91 MMHG/%
PFO2: 2.1 MMHG/%
PFO2: 2.11 MMHG/%
PFO2: 2.12 MMHG/%
PFO2: 2.16 MMHG/%
PFO2: 2.17 MMHG/%
PFO2: 2.38 MMHG/%
PFO2: 2.4 MMHG/%
PFO2: 2.44 MMHG/%
PFO2: 2.57 MMHG/%
PFO2: 2.58 MMHG/%
PFO2: 2.59 MMHG/%
PFO2: 2.77 MMHG/%
PFO2: 4.64 MMHG/%
PH BLOOD GAS: 7.24 (ref 7.35–7.45)
PH BLOOD GAS: 7.27 (ref 7.35–7.45)
PH BLOOD GAS: 7.29 (ref 7.35–7.45)
PH BLOOD GAS: 7.3 (ref 7.35–7.45)
PH BLOOD GAS: 7.3 (ref 7.35–7.45)
PH BLOOD GAS: 7.32 (ref 7.35–7.45)
PH BLOOD GAS: 7.32 (ref 7.35–7.45)
PH BLOOD GAS: 7.33 (ref 7.35–7.45)
PH BLOOD GAS: 7.33 (ref 7.35–7.45)
PH BLOOD GAS: 7.34 (ref 7.35–7.45)
PH BLOOD GAS: 7.35 (ref 7.35–7.45)
PH BLOOD GAS: 7.35 (ref 7.35–7.45)
PH BLOOD GAS: 7.37 (ref 7.35–7.45)
PH BLOOD GAS: 7.37 (ref 7.35–7.45)
PH BLOOD GAS: 7.38 (ref 7.35–7.45)
PH BLOOD GAS: 7.38 (ref 7.35–7.45)
PH BLOOD GAS: 7.39 (ref 7.35–7.45)
PH BLOOD GAS: 7.42 (ref 7.35–7.45)
PH BLOOD GAS: 7.43 (ref 7.35–7.45)
PH BLOOD GAS: 7.45 (ref 7.35–7.45)
PH UR STRIP: 5.5 [PH] (ref 5–9)
PH UR STRIP: 5.5 [PH] (ref 5–9)
PH UR STRIP: 6 [PH] (ref 5–9)
PH UR STRIP: 6 [PH] (ref 5–9)
PHOSPHATE SERPL-MCNC: 1.6 MG/DL (ref 2.5–4.5)
PHOSPHATE SERPL-MCNC: 2 MG/DL (ref 2.5–4.5)
PHOSPHATE SERPL-MCNC: 2.2 MG/DL (ref 2.5–4.5)
PHOSPHATE SERPL-MCNC: 2.2 MG/DL (ref 2.5–4.5)
PHOSPHATE SERPL-MCNC: 2.5 MG/DL (ref 2.5–4.5)
PHOSPHATE SERPL-MCNC: 2.6 MG/DL (ref 2.5–4.5)
PHOSPHATE SERPL-MCNC: 2.8 MG/DL (ref 2.5–4.5)
PHOSPHATE SERPL-MCNC: 3.3 MG/DL (ref 2.5–4.5)
PHOSPHATE SERPL-MCNC: 3.4 MG/DL (ref 2.5–4.5)
PHOSPHATE SERPL-MCNC: 3.5 MG/DL (ref 2.5–4.5)
PHOSPHATE SERPL-MCNC: 3.7 MG/DL (ref 2.5–4.5)
PHOSPHATE SERPL-MCNC: 3.9 MG/DL (ref 2.5–4.5)
PHOSPHATE SERPL-MCNC: 4.2 MG/DL (ref 2.5–4.5)
PHOSPHATE SERPL-MCNC: 4.3 MG/DL (ref 2.5–4.5)
PHOSPHATE SERPL-MCNC: 4.3 MG/DL (ref 2.5–4.5)
PHOSPHATE SERPL-MCNC: 4.4 MG/DL (ref 2.5–4.5)
PHOSPHATE SERPL-MCNC: 4.5 MG/DL (ref 2.5–4.5)
PHOSPHATE SERPL-MCNC: 4.5 MG/DL (ref 2.5–4.5)
PHOSPHATE SERPL-MCNC: 4.6 MG/DL (ref 2.5–4.5)
PLATELET # BLD AUTO: 144 K/UL (ref 130–450)
PLATELET # BLD AUTO: 144 K/UL (ref 130–450)
PLATELET # BLD AUTO: 146 K/UL (ref 130–450)
PLATELET # BLD AUTO: 149 K/UL (ref 130–450)
PLATELET # BLD AUTO: 155 K/UL (ref 130–450)
PLATELET # BLD AUTO: 158 K/UL (ref 130–450)
PLATELET # BLD AUTO: 167 K/UL (ref 130–450)
PLATELET # BLD AUTO: 190 K/UL (ref 130–450)
PLATELET # BLD AUTO: 228 K/UL (ref 130–450)
PLATELET # BLD AUTO: 243 K/UL (ref 130–450)
PLATELET # BLD AUTO: 283 K/UL (ref 130–450)
PLATELET # BLD AUTO: 285 K/UL (ref 130–450)
PMV BLD AUTO: 10.5 FL (ref 7–12)
PMV BLD AUTO: 10.5 FL (ref 7–12)
PMV BLD AUTO: 10.6 FL (ref 7–12)
PMV BLD AUTO: 10.7 FL (ref 7–12)
PMV BLD AUTO: 10.8 FL (ref 7–12)
PMV BLD AUTO: 10.8 FL (ref 7–12)
PMV BLD AUTO: 11 FL (ref 7–12)
PMV BLD AUTO: 11.1 FL (ref 7–12)
PMV BLD AUTO: 11.2 FL (ref 7–12)
PMV BLD AUTO: 11.2 FL (ref 7–12)
PMV BLD AUTO: 11.3 FL (ref 7–12)
PMV BLD AUTO: 11.3 FL (ref 7–12)
PO2: 124.2 MMHG (ref 75–100)
PO2: 131.7 MMHG (ref 75–100)
PO2: 137.2 MMHG (ref 75–100)
PO2: 144.9 MMHG (ref 75–100)
PO2: 147.9 MMHG (ref 75–100)
PO2: 152.1 MMHG (ref 75–100)
PO2: 152.8 MMHG (ref 75–100)
PO2: 165.2 MMHG (ref 75–100)
PO2: 185.6 MMHG (ref 75–100)
PO2: 186.1 MMHG (ref 75–100)
PO2: 189.7 MMHG (ref 75–100)
PO2: 190.7 MMHG (ref 75–100)
PO2: 195 MMHG (ref 75–100)
PO2: 209.9 MMHG (ref 75–100)
PO2: 211.5 MMHG (ref 75–100)
PO2: 215.7 MMHG (ref 75–100)
PO2: 216.4 MMHG (ref 75–100)
PO2: 219.7 MMHG (ref 75–100)
PO2: 231.2 MMHG (ref 75–100)
PO2: 232.3 MMHG (ref 75–100)
PO2: 233.1 MMHG (ref 75–100)
PO2: 238.2 MMHG (ref 75–100)
PO2: 248.9 MMHG (ref 75–100)
PO2: 99.2 MMHG (ref 75–100)
PO2: 99.5 MMHG (ref 75–100)
POTASSIUM SERPL-SCNC: 3.02 MMOL/L (ref 3.5–5)
POTASSIUM SERPL-SCNC: 3.4 MMOL/L (ref 3.5–5)
POTASSIUM SERPL-SCNC: 3.6 MMOL/L (ref 3.5–5)
POTASSIUM SERPL-SCNC: 3.6 MMOL/L (ref 3.5–5)
POTASSIUM SERPL-SCNC: 3.7 MMOL/L (ref 3.5–5)
POTASSIUM SERPL-SCNC: 3.8 MMOL/L (ref 3.5–5)
POTASSIUM SERPL-SCNC: 3.9 MMOL/L (ref 3.5–5)
POTASSIUM SERPL-SCNC: 3.9 MMOL/L (ref 3.5–5)
POTASSIUM SERPL-SCNC: 4 MMOL/L (ref 3.5–5)
POTASSIUM SERPL-SCNC: 4.1 MMOL/L (ref 3.5–5)
POTASSIUM SERPL-SCNC: 4.1 MMOL/L (ref 3.5–5)
POTASSIUM SERPL-SCNC: 4.12 MMOL/L (ref 3.5–5)
POTASSIUM SERPL-SCNC: 4.2 MMOL/L (ref 3.5–5)
POTASSIUM SERPL-SCNC: 4.4 MMOL/L (ref 3.5–5)
POTASSIUM SERPL-SCNC: 4.5 MMOL/L (ref 3.5–5)
PROT SERPL-MCNC: 5.1 G/DL (ref 6.4–8.3)
PROT SERPL-MCNC: 5.3 G/DL (ref 6.4–8.3)
PROT SERPL-MCNC: 5.4 G/DL (ref 6.4–8.3)
PROT SERPL-MCNC: 5.5 G/DL (ref 6.4–8.3)
PROT SERPL-MCNC: 5.6 G/DL (ref 6.4–8.3)
PROT SERPL-MCNC: 5.6 G/DL (ref 6.4–8.3)
PROT SERPL-MCNC: 5.7 G/DL (ref 6.4–8.3)
PROT SERPL-MCNC: 7.2 G/DL (ref 6.4–8.3)
PROT UR STRIP-MCNC: 30 MG/DL
PROT UR STRIP-MCNC: 30 MG/DL
PROT UR STRIP-MCNC: ABNORMAL MG/DL
PROT UR STRIP-MCNC: NEGATIVE MG/DL
PROTHROMBIN TIME: 11.1 SEC (ref 9.3–12.4)
PROTHROMBIN TIME: 11.9 SEC (ref 9.3–12.4)
PROTHROMBIN TIME: 12.6 SEC (ref 9.3–12.4)
PROTHROMBIN TIME: 13 SEC (ref 9.3–12.4)
PROTHROMBIN TIME: 13.9 SEC (ref 9.3–12.4)
PROTHROMBIN TIME: 14.4 SEC (ref 9.3–12.4)
PROTHROMBIN TIME: 14.7 SEC (ref 9.3–12.4)
RBC # BLD AUTO: 2.77 M/UL (ref 3.5–5.5)
RBC # BLD AUTO: 2.78 M/UL (ref 3.5–5.5)
RBC # BLD AUTO: 2.88 M/UL (ref 3.5–5.5)
RBC # BLD AUTO: 2.91 M/UL (ref 3.5–5.5)
RBC # BLD AUTO: 3.08 M/UL (ref 3.5–5.5)
RBC # BLD AUTO: 3.15 M/UL (ref 3.5–5.5)
RBC # BLD AUTO: 3.21 M/UL (ref 3.5–5.5)
RBC # BLD AUTO: 3.32 M/UL (ref 3.5–5.5)
RBC # BLD AUTO: 3.52 M/UL (ref 3.5–5.5)
RBC # BLD AUTO: 3.65 M/UL (ref 3.5–5.5)
RBC # BLD AUTO: 4.16 M/UL (ref 3.5–5.5)
RBC # BLD AUTO: 4.69 M/UL (ref 3.5–5.5)
RBC # BLD: ABNORMAL 10*6/UL
RBC #/AREA URNS HPF: ABNORMAL /HPF
REACTION TIME TEG: 4.5 MIN (ref 5–10)
RI(T): 0.3
RI(T): 1.32
RI(T): 1.49
RI(T): 1.49
RI(T): 1.51
RI(T): 1.64
RI(T): 1.67
RI(T): 1.72
RI(T): 1.96
RI(T): 1.99
RI(T): 2.03
RI(T): 2.04
RI(T): 2.11
RI(T): 2.4
RI(T): 2.48
RI(T): 2.49
RI(T): 2.54
RI(T): 2.77
RI(T): 2.78
RI(T): 2.79
RI(T): 2.86
RI(T): 2.89
RI(T): 3.11
RI(T): 5.55
RI(T): 5.58
RR MECHANICAL: 12 B/MIN
RR MECHANICAL: 14 B/MIN
RR MECHANICAL: 16 B/MIN
RR MECHANICAL: 18 B/MIN
RR MECHANICAL: 20 B/MIN
RR MECHANICAL: 20 B/MIN
SALICYLATES SERPL-MCNC: <0.3 MG/DL (ref 0–30)
SARS-COV-2 RNA RESP QL NAA+PROBE: NOT DETECTED
SODIUM SERPL-SCNC: 140 MMOL/L (ref 132–146)
SODIUM SERPL-SCNC: 140 MMOL/L (ref 132–146)
SODIUM SERPL-SCNC: 141 MMOL/L (ref 132–146)
SODIUM SERPL-SCNC: 141 MMOL/L (ref 132–146)
SODIUM SERPL-SCNC: 142 MMOL/L (ref 132–146)
SODIUM SERPL-SCNC: 145 MMOL/L (ref 132–146)
SODIUM SERPL-SCNC: 147 MMOL/L (ref 132–146)
SODIUM SERPL-SCNC: 148 MMOL/L (ref 132–146)
SODIUM SERPL-SCNC: 148 MMOL/L (ref 132–146)
SODIUM SERPL-SCNC: 149 MMOL/L (ref 132–146)
SODIUM SERPL-SCNC: 150 MMOL/L (ref 132–146)
SODIUM SERPL-SCNC: 150 MMOL/L (ref 132–146)
SODIUM SERPL-SCNC: 151 MMOL/L (ref 132–146)
SODIUM SERPL-SCNC: 152 MMOL/L (ref 132–146)
SODIUM SERPL-SCNC: 153 MMOL/L (ref 132–146)
SODIUM SERPL-SCNC: 153 MMOL/L (ref 132–146)
SODIUM SERPL-SCNC: 155 MMOL/L (ref 132–146)
SODIUM SERPL-SCNC: 155 MMOL/L (ref 132–146)
SODIUM SERPL-SCNC: 156 MMOL/L (ref 132–146)
SODIUM SERPL-SCNC: 157 MMOL/L (ref 132–146)
SODIUM SERPL-SCNC: 157 MMOL/L (ref 132–146)
SODIUM SERPL-SCNC: 160 MMOL/L (ref 132–146)
SODIUM SERPL-SCNC: 161 MMOL/L (ref 132–146)
SODIUM SERPL-SCNC: 161 MMOL/L (ref 132–146)
SODIUM SERPL-SCNC: 162 MMOL/L (ref 132–146)
SODIUM SERPL-SCNC: 163 MMOL/L (ref 132–146)
SODIUM UR-SCNC: 96 MMOL/L
SOURCE, BLOOD GAS: ABNORMAL
SP GR UR STRIP: 1.02 (ref 1–1.03)
SP GR UR STRIP: 1.02 (ref 1–1.03)
SP GR UR STRIP: >1.03 (ref 1–1.03)
SPECIMEN DESCRIPTION: ABNORMAL
SPECIMEN DESCRIPTION: NORMAL
SPECIMEN DESCRIPTION: NORMAL
THB: 10 G/DL (ref 11.5–16.5)
THB: 10.1 G/DL (ref 11.5–16.5)
THB: 10.2 G/DL (ref 11.5–16.5)
THB: 10.4 G/DL (ref 11.5–16.5)
THB: 10.7 G/DL (ref 11.5–16.5)
THB: 10.7 G/DL (ref 11.5–16.5)
THB: 11 G/DL (ref 11.5–16.5)
THB: 11.3 G/DL (ref 11.5–16.5)
THB: 11.4 G/DL (ref 11.5–16.5)
THB: 12.3 G/DL (ref 11.5–16.5)
THB: 14.1 G/DL (ref 11.5–16.5)
THB: 8.4 G/DL (ref 11.5–16.5)
THB: 8.7 G/DL (ref 11.5–16.5)
THB: 9.2 G/DL (ref 11.5–16.5)
THB: 9.2 G/DL (ref 11.5–16.5)
THB: 9.3 G/DL (ref 11.5–16.5)
THB: 9.5 G/DL (ref 11.5–16.5)
THB: 9.6 G/DL (ref 11.5–16.5)
THB: 9.6 G/DL (ref 11.5–16.5)
THB: 9.7 G/DL (ref 11.5–16.5)
THB: 9.7 G/DL (ref 11.5–16.5)
THB: 9.8 G/DL (ref 11.5–16.5)
THB: 9.9 G/DL (ref 11.5–16.5)
TIME ANALYZED: 1026
TIME ANALYZED: 1028
TIME ANALYZED: 1348
TIME ANALYZED: 1353
TIME ANALYZED: 1408
TIME ANALYZED: 1422
TIME ANALYZED: 1431
TIME ANALYZED: 155
TIME ANALYZED: 1603
TIME ANALYZED: 1819
TIME ANALYZED: 1833
TIME ANALYZED: 1849
TIME ANALYZED: 2115
TIME ANALYZED: 218
TIME ANALYZED: 2209
TIME ANALYZED: 2209
TIME ANALYZED: 224
TIME ANALYZED: 455
TIME ANALYZED: 524
TIME ANALYZED: 547
TIME ANALYZED: 57
TIME ANALYZED: 605
TIME ANALYZED: 620
TIME ANALYZED: 839
TIME ANALYZED: 948
TOXIC TRICYCLIC SC,BLOOD: NEGATIVE
TRIGL SERPL-MCNC: 135 MG/DL
TROPONIN I SERPL HS-MCNC: 133 NG/L (ref 0–9)
TROPONIN I SERPL HS-MCNC: 155 NG/L (ref 0–9)
TROPONIN I SERPL HS-MCNC: 171 NG/L (ref 0–9)
TROPONIN I SERPL HS-MCNC: 194 NG/L (ref 0–9)
TROPONIN I SERPL HS-MCNC: 199 NG/L (ref 0–9)
TROPONIN I SERPL HS-MCNC: 206 NG/L (ref 0–9)
TROPONIN I SERPL HS-MCNC: 210 NG/L (ref 0–9)
TROPONIN I SERPL HS-MCNC: 227 NG/L (ref 0–9)
TSH SERPL DL<=0.05 MIU/L-ACNC: 0.74 UIU/ML (ref 0.27–4.2)
UROBILINOGEN UR STRIP-ACNC: 0.2 EU/DL (ref 0–1)
VT MECHANICAL: 450 ML
WBC #/AREA URNS HPF: ABNORMAL /HPF
WBC OTHER # BLD: 10.3 K/UL (ref 4.5–11.5)
WBC OTHER # BLD: 10.6 K/UL (ref 4.5–11.5)
WBC OTHER # BLD: 10.6 K/UL (ref 4.5–11.5)
WBC OTHER # BLD: 5 K/UL (ref 4.5–11.5)
WBC OTHER # BLD: 5.4 K/UL (ref 4.5–11.5)
WBC OTHER # BLD: 6 K/UL (ref 4.5–11.5)
WBC OTHER # BLD: 6.3 K/UL (ref 4.5–11.5)
WBC OTHER # BLD: 6.7 K/UL (ref 4.5–11.5)
WBC OTHER # BLD: 7.1 K/UL (ref 4.5–11.5)
WBC OTHER # BLD: 7.1 K/UL (ref 4.5–11.5)
WBC OTHER # BLD: 8.5 K/UL (ref 4.5–11.5)
WBC OTHER # BLD: 8.9 K/UL (ref 4.5–11.5)

## 2024-01-01 PROCEDURE — 82805 BLOOD GASES W/O2 SATURATION: CPT

## 2024-01-01 PROCEDURE — 71045 X-RAY EXAM CHEST 1 VIEW: CPT

## 2024-01-01 PROCEDURE — 83605 ASSAY OF LACTIC ACID: CPT

## 2024-01-01 PROCEDURE — 80048 BASIC METABOLIC PNL TOTAL CA: CPT

## 2024-01-01 PROCEDURE — 2580000003 HC RX 258

## 2024-01-01 PROCEDURE — 31500 INSERT EMERGENCY AIRWAY: CPT

## 2024-01-01 PROCEDURE — 99285 EMERGENCY DEPT VISIT HI MDM: CPT

## 2024-01-01 PROCEDURE — 96374 THER/PROPH/DIAG INJ IV PUSH: CPT

## 2024-01-01 PROCEDURE — 6360000002 HC RX W HCPCS

## 2024-01-01 PROCEDURE — 2000000000 HC ICU R&B

## 2024-01-01 PROCEDURE — 70498 CT ANGIOGRAPHY NECK: CPT

## 2024-01-01 PROCEDURE — 70450 CT HEAD/BRAIN W/O DYE: CPT

## 2024-01-01 PROCEDURE — 37799 UNLISTED PX VASCULAR SURGERY: CPT

## 2024-01-01 PROCEDURE — 84300 ASSAY OF URINE SODIUM: CPT

## 2024-01-01 PROCEDURE — 2580000003 HC RX 258: Performed by: SURGERY

## 2024-01-01 PROCEDURE — 85025 COMPLETE CBC W/AUTO DIFF WBC: CPT

## 2024-01-01 PROCEDURE — 6370000000 HC RX 637 (ALT 250 FOR IP): Performed by: STUDENT IN AN ORGANIZED HEALTH CARE EDUCATION/TRAINING PROGRAM

## 2024-01-01 PROCEDURE — 99291 CRITICAL CARE FIRST HOUR: CPT | Performed by: SURGERY

## 2024-01-01 PROCEDURE — 84100 ASSAY OF PHOSPHORUS: CPT

## 2024-01-01 PROCEDURE — 2500000003 HC RX 250 WO HCPCS: Performed by: STUDENT IN AN ORGANIZED HEALTH CARE EDUCATION/TRAINING PROGRAM

## 2024-01-01 PROCEDURE — 83735 ASSAY OF MAGNESIUM: CPT

## 2024-01-01 PROCEDURE — 82150 ASSAY OF AMYLASE: CPT

## 2024-01-01 PROCEDURE — 72125 CT NECK SPINE W/O DYE: CPT

## 2024-01-01 PROCEDURE — 80053 COMPREHEN METABOLIC PANEL: CPT

## 2024-01-01 PROCEDURE — 81001 URINALYSIS AUTO W/SCOPE: CPT

## 2024-01-01 PROCEDURE — 2500000003 HC RX 250 WO HCPCS

## 2024-01-01 PROCEDURE — A4216 STERILE WATER/SALINE, 10 ML: HCPCS | Performed by: STUDENT IN AN ORGANIZED HEALTH CARE EDUCATION/TRAINING PROGRAM

## 2024-01-01 PROCEDURE — 3600000014 HC SURGERY LEVEL 4 ADDTL 15MIN

## 2024-01-01 PROCEDURE — 94669 MECHANICAL CHEST WALL OSCILL: CPT

## 2024-01-01 PROCEDURE — 99232 SBSQ HOSP IP/OBS MODERATE 35: CPT | Performed by: EMERGENCY MEDICINE

## 2024-01-01 PROCEDURE — 2580000003 HC RX 258: Performed by: STUDENT IN AN ORGANIZED HEALTH CARE EDUCATION/TRAINING PROGRAM

## 2024-01-01 PROCEDURE — 87070 CULTURE OTHR SPECIMN AEROBIC: CPT

## 2024-01-01 PROCEDURE — 36620 INSERTION CATHETER ARTERY: CPT | Performed by: SURGERY

## 2024-01-01 PROCEDURE — 94640 AIRWAY INHALATION TREATMENT: CPT

## 2024-01-01 PROCEDURE — 80307 DRUG TEST PRSMV CHEM ANLYZR: CPT

## 2024-01-01 PROCEDURE — 70496 CT ANGIOGRAPHY HEAD: CPT

## 2024-01-01 PROCEDURE — 74177 CT ABD & PELVIS W/CONTRAST: CPT

## 2024-01-01 PROCEDURE — 94003 VENT MGMT INPAT SUBQ DAY: CPT

## 2024-01-01 PROCEDURE — 82533 TOTAL CORTISOL: CPT

## 2024-01-01 PROCEDURE — 6360000002 HC RX W HCPCS: Performed by: STUDENT IN AN ORGANIZED HEALTH CARE EDUCATION/TRAINING PROGRAM

## 2024-01-01 PROCEDURE — 6360000004 HC RX CONTRAST MEDICATION: Performed by: RADIOLOGY

## 2024-01-01 PROCEDURE — 51702 INSERT TEMP BLADDER CATH: CPT

## 2024-01-01 PROCEDURE — 72128 CT CHEST SPINE W/O DYE: CPT

## 2024-01-01 PROCEDURE — 84132 ASSAY OF SERUM POTASSIUM: CPT

## 2024-01-01 PROCEDURE — 83690 ASSAY OF LIPASE: CPT

## 2024-01-01 PROCEDURE — 86900 BLOOD TYPING SEROLOGIC ABO: CPT

## 2024-01-01 PROCEDURE — 87205 SMEAR GRAM STAIN: CPT

## 2024-01-01 PROCEDURE — 85347 COAGULATION TIME ACTIVATED: CPT

## 2024-01-01 PROCEDURE — 71260 CT THORAX DX C+: CPT

## 2024-01-01 PROCEDURE — 99233 SBSQ HOSP IP/OBS HIGH 50: CPT | Performed by: SURGERY

## 2024-01-01 PROCEDURE — 85610 PROTHROMBIN TIME: CPT

## 2024-01-01 PROCEDURE — 83036 HEMOGLOBIN GLYCOSYLATED A1C: CPT

## 2024-01-01 PROCEDURE — 86901 BLOOD TYPING SEROLOGIC RH(D): CPT

## 2024-01-01 PROCEDURE — 6360000002 HC RX W HCPCS: Performed by: SURGERY

## 2024-01-01 PROCEDURE — 82330 ASSAY OF CALCIUM: CPT

## 2024-01-01 PROCEDURE — 82248 BILIRUBIN DIRECT: CPT

## 2024-01-01 PROCEDURE — 87635 SARS-COV-2 COVID-19 AMP PRB: CPT

## 2024-01-01 PROCEDURE — 5A1955Z RESPIRATORY VENTILATION, GREATER THAN 96 CONSECUTIVE HOURS: ICD-10-PCS | Performed by: STUDENT IN AN ORGANIZED HEALTH CARE EDUCATION/TRAINING PROGRAM

## 2024-01-01 PROCEDURE — 36556 INSERT NON-TUNNEL CV CATH: CPT | Performed by: SURGERY

## 2024-01-01 PROCEDURE — 84295 ASSAY OF SERUM SODIUM: CPT

## 2024-01-01 PROCEDURE — 84478 ASSAY OF TRIGLYCERIDES: CPT

## 2024-01-01 PROCEDURE — 83935 ASSAY OF URINE OSMOLALITY: CPT

## 2024-01-01 PROCEDURE — 85576 BLOOD PLATELET AGGREGATION: CPT

## 2024-01-01 PROCEDURE — 4A133B1 MONITORING OF ARTERIAL PRESSURE, PERIPHERAL, PERCUTANEOUS APPROACH: ICD-10-PCS | Performed by: STUDENT IN AN ORGANIZED HEALTH CARE EDUCATION/TRAINING PROGRAM

## 2024-01-01 PROCEDURE — 86850 RBC ANTIBODY SCREEN: CPT

## 2024-01-01 PROCEDURE — 84484 ASSAY OF TROPONIN QUANT: CPT

## 2024-01-01 PROCEDURE — 03HC33Z INSERTION OF INFUSION DEVICE INTO LEFT RADIAL ARTERY, PERCUTANEOUS APPROACH: ICD-10-PCS | Performed by: STUDENT IN AN ORGANIZED HEALTH CARE EDUCATION/TRAINING PROGRAM

## 2024-01-01 PROCEDURE — 94002 VENT MGMT INPAT INIT DAY: CPT

## 2024-01-01 PROCEDURE — 99449 NTRPROF PH1/NTRNET/EHR 31/>: CPT | Performed by: STUDENT IN AN ORGANIZED HEALTH CARE EDUCATION/TRAINING PROGRAM

## 2024-01-01 PROCEDURE — 36620 INSERTION CATHETER ARTERY: CPT

## 2024-01-01 PROCEDURE — 72170 X-RAY EXAM OF PELVIS: CPT

## 2024-01-01 PROCEDURE — 36556 INSERT NON-TUNNEL CV CATH: CPT

## 2024-01-01 PROCEDURE — 71250 CT THORAX DX C-: CPT

## 2024-01-01 PROCEDURE — 84443 ASSAY THYROID STIM HORMONE: CPT

## 2024-01-01 PROCEDURE — 4A133J1 MONITORING OF ARTERIAL PULSE, PERIPHERAL, PERCUTANEOUS APPROACH: ICD-10-PCS | Performed by: STUDENT IN AN ORGANIZED HEALTH CARE EDUCATION/TRAINING PROGRAM

## 2024-01-01 PROCEDURE — 80143 DRUG ASSAY ACETAMINOPHEN: CPT

## 2024-01-01 PROCEDURE — 0BH17EZ INSERTION OF ENDOTRACHEAL AIRWAY INTO TRACHEA, VIA NATURAL OR ARTIFICIAL OPENING: ICD-10-PCS | Performed by: STUDENT IN AN ORGANIZED HEALTH CARE EDUCATION/TRAINING PROGRAM

## 2024-01-01 PROCEDURE — 85730 THROMBOPLASTIN TIME PARTIAL: CPT

## 2024-01-01 PROCEDURE — 36410 VNPNXR 3YR/> PHY/QHP DX/THER: CPT | Performed by: SURGERY

## 2024-01-01 PROCEDURE — 85384 FIBRINOGEN ACTIVITY: CPT

## 2024-01-01 PROCEDURE — 93306 TTE W/DOPPLER COMPLETE: CPT

## 2024-01-01 PROCEDURE — 99222 1ST HOSP IP/OBS MODERATE 55: CPT | Performed by: EMERGENCY MEDICINE

## 2024-01-01 PROCEDURE — 72131 CT LUMBAR SPINE W/O DYE: CPT

## 2024-01-01 PROCEDURE — 80179 DRUG ASSAY SALICYLATE: CPT

## 2024-01-01 PROCEDURE — 85027 COMPLETE CBC AUTOMATED: CPT

## 2024-01-01 PROCEDURE — 93306 TTE W/DOPPLER COMPLETE: CPT | Performed by: INTERNAL MEDICINE

## 2024-01-01 PROCEDURE — 99222 1ST HOSP IP/OBS MODERATE 55: CPT | Performed by: NEUROLOGICAL SURGERY

## 2024-01-01 PROCEDURE — 85390 FIBRINOLYSINS SCREEN I&R: CPT

## 2024-01-01 PROCEDURE — 87081 CULTURE SCREEN ONLY: CPT

## 2024-01-01 PROCEDURE — 6810039001 HC L1 TRAUMA PRIORITY

## 2024-01-01 PROCEDURE — G0480 DRUG TEST DEF 1-7 CLASSES: HCPCS

## 2024-01-01 PROCEDURE — 2709999900 HC NON-CHARGEABLE SUPPLY

## 2024-01-01 PROCEDURE — 02HV33Z INSERTION OF INFUSION DEVICE INTO SUPERIOR VENA CAVA, PERCUTANEOUS APPROACH: ICD-10-PCS | Performed by: STUDENT IN AN ORGANIZED HEALTH CARE EDUCATION/TRAINING PROGRAM

## 2024-01-01 PROCEDURE — 82550 ASSAY OF CK (CPK): CPT

## 2024-01-01 PROCEDURE — 3600000004 HC SURGERY LEVEL 4 BASE

## 2024-01-01 PROCEDURE — 99232 SBSQ HOSP IP/OBS MODERATE 35: CPT | Performed by: NEUROLOGICAL SURGERY

## 2024-01-01 RX ORDER — ONDANSETRON 4 MG/1
4 TABLET, ORALLY DISINTEGRATING ORAL EVERY 8 HOURS PRN
Status: DISCONTINUED | OUTPATIENT
Start: 2024-01-01 | End: 2024-01-01 | Stop reason: HOSPADM

## 2024-01-01 RX ORDER — NOREPINEPHRINE BITARTRATE 0.06 MG/ML
INJECTION, SOLUTION INTRAVENOUS
Status: DISCONTINUED
Start: 2024-01-01 | End: 2024-01-01

## 2024-01-01 RX ORDER — 3% SODIUM CHLORIDE 3 G/100ML
30 INJECTION, SOLUTION INTRAVENOUS CONTINUOUS
Status: DISPENSED | OUTPATIENT
Start: 2024-01-01 | End: 2024-01-01

## 2024-01-01 RX ORDER — PROPOFOL 10 MG/ML
5-50 INJECTION, EMULSION INTRAVENOUS CONTINUOUS
Status: DISCONTINUED | OUTPATIENT
Start: 2024-01-01 | End: 2024-01-01

## 2024-01-01 RX ORDER — FENTANYL CITRATE 50 UG/ML
100 INJECTION, SOLUTION INTRAMUSCULAR; INTRAVENOUS ONCE
Status: COMPLETED | OUTPATIENT
Start: 2024-01-01 | End: 2024-01-01

## 2024-01-01 RX ORDER — LABETALOL HYDROCHLORIDE 5 MG/ML
INJECTION, SOLUTION INTRAVENOUS DAILY PRN
Status: COMPLETED | OUTPATIENT
Start: 2024-01-01 | End: 2024-01-01

## 2024-01-01 RX ORDER — FUROSEMIDE 10 MG/ML
40 INJECTION INTRAMUSCULAR; INTRAVENOUS ONCE
Status: COMPLETED | OUTPATIENT
Start: 2024-01-01 | End: 2024-01-01

## 2024-01-01 RX ORDER — POLYETHYLENE GLYCOL 3350 17 G/17G
17 POWDER, FOR SOLUTION ORAL DAILY PRN
Status: DISCONTINUED | OUTPATIENT
Start: 2024-01-01 | End: 2024-01-01 | Stop reason: HOSPADM

## 2024-01-01 RX ORDER — MIDAZOLAM HYDROCHLORIDE 2 MG/2ML
0.5 INJECTION, SOLUTION INTRAMUSCULAR; INTRAVENOUS
Status: DISCONTINUED | OUTPATIENT
Start: 2024-01-01 | End: 2024-01-01 | Stop reason: HOSPADM

## 2024-01-01 RX ORDER — SODIUM CHLORIDE 0.9 % (FLUSH) 0.9 %
10 SYRINGE (ML) INJECTION
Status: DISPENSED | OUTPATIENT
Start: 2024-01-01 | End: 2024-01-01

## 2024-01-01 RX ORDER — LORAZEPAM 2 MG/ML
2 INJECTION INTRAMUSCULAR ONCE
Status: COMPLETED | OUTPATIENT
Start: 2024-01-01 | End: 2024-01-01

## 2024-01-01 RX ORDER — 3% SODIUM CHLORIDE 3 G/100ML
30 INJECTION, SOLUTION INTRAVENOUS CONTINUOUS
Status: DISCONTINUED | OUTPATIENT
Start: 2024-01-01 | End: 2024-01-01

## 2024-01-01 RX ORDER — LEVETIRACETAM 500 MG/5ML
1000 INJECTION, SOLUTION, CONCENTRATE INTRAVENOUS EVERY 12 HOURS
Status: DISCONTINUED | OUTPATIENT
Start: 2024-01-01 | End: 2024-01-01

## 2024-01-01 RX ORDER — CHLORHEXIDINE GLUCONATE ORAL RINSE 1.2 MG/ML
15 SOLUTION DENTAL 2 TIMES DAILY
Status: DISCONTINUED | OUTPATIENT
Start: 2024-01-01 | End: 2024-01-01 | Stop reason: HOSPADM

## 2024-01-01 RX ORDER — IPRATROPIUM BROMIDE AND ALBUTEROL SULFATE 2.5; .5 MG/3ML; MG/3ML
1 SOLUTION RESPIRATORY (INHALATION)
Status: DISCONTINUED | OUTPATIENT
Start: 2024-01-01 | End: 2024-01-01

## 2024-01-01 RX ORDER — LEVETIRACETAM 500 MG/5ML
1000 INJECTION, SOLUTION, CONCENTRATE INTRAVENOUS ONCE
Status: COMPLETED | OUTPATIENT
Start: 2024-01-01 | End: 2024-01-01

## 2024-01-01 RX ORDER — DIMETHICONE, OXYBENZONE, AND PADIMATE O 2; 2.5; 6.6 G/100G; G/100G; G/100G
STICK TOPICAL PRN
Status: DISCONTINUED | OUTPATIENT
Start: 2024-01-01 | End: 2024-01-01 | Stop reason: HOSPADM

## 2024-01-01 RX ORDER — ACETAMINOPHEN 650 MG/1
650 SUPPOSITORY RECTAL EVERY 6 HOURS PRN
Status: DISCONTINUED | OUTPATIENT
Start: 2024-01-01 | End: 2024-01-01 | Stop reason: HOSPADM

## 2024-01-01 RX ORDER — ACETAMINOPHEN 325 MG/1
650 TABLET ORAL EVERY 6 HOURS PRN
Status: DISCONTINUED | OUTPATIENT
Start: 2024-01-01 | End: 2024-01-01 | Stop reason: HOSPADM

## 2024-01-01 RX ORDER — POTASSIUM CHLORIDE 7.45 MG/ML
10 INJECTION INTRAVENOUS
Status: DISCONTINUED | OUTPATIENT
Start: 2024-01-01 | End: 2024-01-01

## 2024-01-01 RX ORDER — MAGNESIUM SULFATE IN WATER 40 MG/ML
2000 INJECTION, SOLUTION INTRAVENOUS PRN
Status: DISCONTINUED | OUTPATIENT
Start: 2024-01-01 | End: 2024-01-01

## 2024-01-01 RX ORDER — POTASSIUM CHLORIDE 7.45 MG/ML
10 INJECTION INTRAVENOUS
Status: ACTIVE | OUTPATIENT
Start: 2024-01-01 | End: 2024-01-01

## 2024-01-01 RX ORDER — LABETALOL HYDROCHLORIDE 5 MG/ML
10 INJECTION, SOLUTION INTRAVENOUS EVERY 30 MIN PRN
Status: DISCONTINUED | OUTPATIENT
Start: 2024-01-01 | End: 2024-01-01 | Stop reason: HOSPADM

## 2024-01-01 RX ORDER — MIDAZOLAM HYDROCHLORIDE 2 MG/2ML
2 INJECTION, SOLUTION INTRAMUSCULAR; INTRAVENOUS ONCE
Status: COMPLETED | OUTPATIENT
Start: 2024-01-01 | End: 2024-01-01

## 2024-01-01 RX ORDER — FENTANYL CITRATE 50 UG/ML
INJECTION, SOLUTION INTRAMUSCULAR; INTRAVENOUS
Status: COMPLETED
Start: 2024-01-01 | End: 2024-01-01

## 2024-01-01 RX ORDER — MAGNESIUM SULFATE IN WATER 40 MG/ML
2000 INJECTION, SOLUTION INTRAVENOUS ONCE
Status: COMPLETED | OUTPATIENT
Start: 2024-01-01 | End: 2024-01-01

## 2024-01-01 RX ORDER — ONDANSETRON 2 MG/ML
4 INJECTION INTRAMUSCULAR; INTRAVENOUS EVERY 6 HOURS PRN
Status: DISCONTINUED | OUTPATIENT
Start: 2024-01-01 | End: 2024-01-01 | Stop reason: HOSPADM

## 2024-01-01 RX ORDER — MINERAL OIL AND WHITE PETROLATUM 150; 830 MG/G; MG/G
OINTMENT OPHTHALMIC EVERY 4 HOURS
Status: DISCONTINUED | OUTPATIENT
Start: 2024-01-01 | End: 2024-01-01 | Stop reason: HOSPADM

## 2024-01-01 RX ORDER — HYDRALAZINE HYDROCHLORIDE 20 MG/ML
INJECTION INTRAMUSCULAR; INTRAVENOUS
Status: COMPLETED
Start: 2024-01-01 | End: 2024-01-01

## 2024-01-01 RX ORDER — POTASSIUM CHLORIDE 29.8 MG/ML
20 INJECTION INTRAVENOUS
Status: COMPLETED | OUTPATIENT
Start: 2024-01-01 | End: 2024-01-01

## 2024-01-01 RX ORDER — LORAZEPAM 2 MG/ML
0.5 INJECTION INTRAMUSCULAR
Status: DISCONTINUED | OUTPATIENT
Start: 2024-01-01 | End: 2024-01-01 | Stop reason: HOSPADM

## 2024-01-01 RX ORDER — MORPHINE SULFATE 2 MG/ML
2 INJECTION, SOLUTION INTRAMUSCULAR; INTRAVENOUS
Status: DISCONTINUED | OUTPATIENT
Start: 2024-01-01 | End: 2024-01-01 | Stop reason: HOSPADM

## 2024-01-01 RX ORDER — SODIUM CHLORIDE 9 MG/ML
INJECTION, SOLUTION INTRAVENOUS CONTINUOUS
Status: DISCONTINUED | OUTPATIENT
Start: 2024-01-01 | End: 2024-01-01

## 2024-01-01 RX ORDER — LEVETIRACETAM 500 MG/5ML
1000 INJECTION, SOLUTION, CONCENTRATE INTRAVENOUS EVERY 12 HOURS
Status: DISCONTINUED | OUTPATIENT
Start: 2024-01-01 | End: 2024-01-01 | Stop reason: HOSPADM

## 2024-01-01 RX ORDER — HEPARIN SODIUM 5000 [USP'U]/ML
5000 INJECTION, SOLUTION INTRAVENOUS; SUBCUTANEOUS EVERY 8 HOURS SCHEDULED
Status: DISCONTINUED | OUTPATIENT
Start: 2024-01-01 | End: 2024-01-01

## 2024-01-01 RX ORDER — SODIUM CHLORIDE 0.9 % (FLUSH) 0.9 %
5-40 SYRINGE (ML) INJECTION EVERY 12 HOURS SCHEDULED
Status: DISCONTINUED | OUTPATIENT
Start: 2024-01-01 | End: 2024-01-01 | Stop reason: HOSPADM

## 2024-01-01 RX ORDER — SODIUM CHLORIDE 9 MG/ML
INJECTION, SOLUTION INTRAVENOUS PRN
Status: DISCONTINUED | OUTPATIENT
Start: 2024-01-01 | End: 2024-01-01 | Stop reason: HOSPADM

## 2024-01-01 RX ORDER — GLYCOPYRROLATE 0.2 MG/ML
0.2 INJECTION INTRAMUSCULAR; INTRAVENOUS EVERY 4 HOURS PRN
Status: DISCONTINUED | OUTPATIENT
Start: 2024-01-01 | End: 2024-01-01 | Stop reason: HOSPADM

## 2024-01-01 RX ORDER — DEXTROSE AND SODIUM CHLORIDE 5; .45 G/100ML; G/100ML
INJECTION, SOLUTION INTRAVENOUS CONTINUOUS
Status: DISCONTINUED | OUTPATIENT
Start: 2024-01-01 | End: 2024-01-01

## 2024-01-01 RX ORDER — PROPOFOL 10 MG/ML
INJECTION, EMULSION INTRAVENOUS CONTINUOUS PRN
Status: COMPLETED | OUTPATIENT
Start: 2024-01-01 | End: 2024-01-01

## 2024-01-01 RX ORDER — HYDRALAZINE HYDROCHLORIDE 20 MG/ML
10 INJECTION INTRAMUSCULAR; INTRAVENOUS ONCE
Status: COMPLETED | OUTPATIENT
Start: 2024-01-01 | End: 2024-01-01

## 2024-01-01 RX ORDER — NOREPINEPHRINE BITARTRATE 0.06 MG/ML
1-100 INJECTION, SOLUTION INTRAVENOUS CONTINUOUS
Status: DISCONTINUED | OUTPATIENT
Start: 2024-01-01 | End: 2024-01-01 | Stop reason: HOSPADM

## 2024-01-01 RX ORDER — ALBUTEROL SULFATE 2.5 MG/3ML
2.5 SOLUTION RESPIRATORY (INHALATION)
Status: DISCONTINUED | OUTPATIENT
Start: 2024-01-01 | End: 2024-01-01

## 2024-01-01 RX ORDER — SODIUM CHLORIDE 0.9 % (FLUSH) 0.9 %
5-40 SYRINGE (ML) INJECTION PRN
Status: DISCONTINUED | OUTPATIENT
Start: 2024-01-01 | End: 2024-01-01 | Stop reason: HOSPADM

## 2024-01-01 RX ORDER — HYDRALAZINE HYDROCHLORIDE 20 MG/ML
10 INJECTION INTRAMUSCULAR; INTRAVENOUS EVERY 30 MIN PRN
Status: DISCONTINUED | OUTPATIENT
Start: 2024-01-01 | End: 2024-01-01 | Stop reason: HOSPADM

## 2024-01-01 RX ORDER — SODIUM CHLORIDE 9 MG/ML
INJECTION, SOLUTION INTRAVENOUS ONCE
Status: COMPLETED | OUTPATIENT
Start: 2024-01-01 | End: 2024-01-01

## 2024-01-01 RX ORDER — DEXTROSE MONOHYDRATE 50 MG/ML
INJECTION, SOLUTION INTRAVENOUS CONTINUOUS
Status: DISCONTINUED | OUTPATIENT
Start: 2024-01-01 | End: 2024-01-01 | Stop reason: HOSPADM

## 2024-01-01 RX ADMIN — HYDRALAZINE HYDROCHLORIDE 10 MG: 20 INJECTION, SOLUTION INTRAMUSCULAR; INTRAVENOUS at 17:29

## 2024-01-01 RX ADMIN — PROPOFOL 40 MCG/KG/MIN: 10 INJECTION, EMULSION INTRAVENOUS at 04:46

## 2024-01-01 RX ADMIN — PROPOFOL 15 MCG/KG/MIN: 10 INJECTION, EMULSION INTRAVENOUS at 16:18

## 2024-01-01 RX ADMIN — SODIUM CHLORIDE, PRESERVATIVE FREE 10 ML: 5 INJECTION INTRAVENOUS at 07:26

## 2024-01-01 RX ADMIN — MINERAL OIL, WHITE PETROLATUM: .03; .94 OINTMENT OPHTHALMIC at 00:02

## 2024-01-01 RX ADMIN — ALBUTEROL SULFATE 2.5 MG: 2.5 SOLUTION RESPIRATORY (INHALATION) at 16:33

## 2024-01-01 RX ADMIN — FENTANYL CITRATE 100 MCG: 50 INJECTION, SOLUTION INTRAMUSCULAR; INTRAVENOUS at 17:29

## 2024-01-01 RX ADMIN — LABETALOL HYDROCHLORIDE 10 MG: 5 INJECTION INTRAVENOUS at 16:02

## 2024-01-01 RX ADMIN — LEVETIRACETAM 1000 MG: 100 INJECTION INTRAVENOUS at 06:17

## 2024-01-01 RX ADMIN — MINERAL OIL, WHITE PETROLATUM: .03; .94 OINTMENT OPHTHALMIC at 12:18

## 2024-01-01 RX ADMIN — AMPICILLIN SODIUM AND SULBACTAM SODIUM 3000 MG: 2; 1 INJECTION, POWDER, FOR SOLUTION INTRAMUSCULAR; INTRAVENOUS at 08:33

## 2024-01-01 RX ADMIN — MINERAL OIL, WHITE PETROLATUM: .03; .94 OINTMENT OPHTHALMIC at 07:26

## 2024-01-01 RX ADMIN — SODIUM CHLORIDE: 9 INJECTION, SOLUTION INTRAVENOUS at 06:24

## 2024-01-01 RX ADMIN — PROPOFOL 35 MCG/KG/MIN: 10 INJECTION, EMULSION INTRAVENOUS at 21:13

## 2024-01-01 RX ADMIN — POTASSIUM CHLORIDE 20 MEQ: 29.8 INJECTION, SOLUTION INTRAVENOUS at 03:24

## 2024-01-01 RX ADMIN — SODIUM CHLORIDE 2.5 MG/HR: 9 INJECTION, SOLUTION INTRAVENOUS at 21:40

## 2024-01-01 RX ADMIN — HYDRALAZINE HYDROCHLORIDE 10 MG: 20 INJECTION, SOLUTION INTRAMUSCULAR; INTRAVENOUS at 22:05

## 2024-01-01 RX ADMIN — DEXTROSE MONOHYDRATE: 50 INJECTION, SOLUTION INTRAVENOUS at 02:45

## 2024-01-01 RX ADMIN — CHLORHEXIDINE GLUCONATE, 0.12% ORAL RINSE 15 ML: 1.2 SOLUTION DENTAL at 19:29

## 2024-01-01 RX ADMIN — FUROSEMIDE 40 MG: 10 INJECTION, SOLUTION INTRAMUSCULAR; INTRAVENOUS at 20:03

## 2024-01-01 RX ADMIN — MINERAL OIL, WHITE PETROLATUM: .03; .94 OINTMENT OPHTHALMIC at 03:17

## 2024-01-01 RX ADMIN — HYDRALAZINE HYDROCHLORIDE 10 MG: 20 INJECTION, SOLUTION INTRAMUSCULAR; INTRAVENOUS at 00:19

## 2024-01-01 RX ADMIN — PROPOFOL 30 MCG/KG/MIN: 10 INJECTION, EMULSION INTRAVENOUS at 12:16

## 2024-01-01 RX ADMIN — LEVETIRACETAM 1000 MG: 100 INJECTION INTRAVENOUS at 06:51

## 2024-01-01 RX ADMIN — DESMOPRESSIN ACETATE 31 MCG: 4 INJECTION INTRAVENOUS; SUBCUTANEOUS at 18:05

## 2024-01-01 RX ADMIN — POLYVINYL ALCOHOL, POVIDONE 1 DROP: 14; 6 SOLUTION/ DROPS OPHTHALMIC at 12:12

## 2024-01-01 RX ADMIN — POLYVINYL ALCOHOL, POVIDONE 1 DROP: 14; 6 SOLUTION/ DROPS OPHTHALMIC at 09:13

## 2024-01-01 RX ADMIN — IOPAMIDOL 75 ML: 755 INJECTION, SOLUTION INTRAVENOUS at 16:56

## 2024-01-01 RX ADMIN — ALBUTEROL SULFATE 2.5 MG: 2.5 SOLUTION RESPIRATORY (INHALATION) at 13:31

## 2024-01-01 RX ADMIN — MORPHINE SULFATE 2 MG: 2 INJECTION, SOLUTION INTRAMUSCULAR; INTRAVENOUS at 13:45

## 2024-01-01 RX ADMIN — AMPICILLIN SODIUM AND SULBACTAM SODIUM 3000 MG: 2; 1 INJECTION, POWDER, FOR SOLUTION INTRAMUSCULAR; INTRAVENOUS at 21:13

## 2024-01-01 RX ADMIN — FAMOTIDINE 20 MG: 10 INJECTION, SOLUTION INTRAVENOUS at 07:46

## 2024-01-01 RX ADMIN — GLYCOPYRROLATE 0.2 MG: 0.2 INJECTION INTRAMUSCULAR; INTRAVENOUS at 13:43

## 2024-01-01 RX ADMIN — POTASSIUM CHLORIDE 20 MEQ: 29.8 INJECTION, SOLUTION INTRAVENOUS at 21:21

## 2024-01-01 RX ADMIN — POLYVINYL ALCOHOL, POVIDONE 1 DROP: 14; 6 SOLUTION/ DROPS OPHTHALMIC at 05:54

## 2024-01-01 RX ADMIN — SODIUM CHLORIDE, PRESERVATIVE FREE 10 ML: 5 INJECTION INTRAVENOUS at 20:03

## 2024-01-01 RX ADMIN — FAMOTIDINE 20 MG: 10 INJECTION, SOLUTION INTRAVENOUS at 19:29

## 2024-01-01 RX ADMIN — FAMOTIDINE 20 MG: 10 INJECTION, SOLUTION INTRAVENOUS at 19:52

## 2024-01-01 RX ADMIN — SODIUM CHLORIDE, PRESERVATIVE FREE 10 ML: 5 INJECTION INTRAVENOUS at 19:29

## 2024-01-01 RX ADMIN — CHLORHEXIDINE GLUCONATE, 0.12% ORAL RINSE 15 ML: 1.2 SOLUTION DENTAL at 20:03

## 2024-01-01 RX ADMIN — SODIUM CHLORIDE 5 MG/HR: 9 INJECTION, SOLUTION INTRAVENOUS at 01:52

## 2024-01-01 RX ADMIN — IOPAMIDOL 75 ML: 755 INJECTION, SOLUTION INTRAVENOUS at 16:03

## 2024-01-01 RX ADMIN — HYDRALAZINE HYDROCHLORIDE 10 MG: 20 INJECTION INTRAMUSCULAR; INTRAVENOUS at 17:29

## 2024-01-01 RX ADMIN — CHLORHEXIDINE GLUCONATE, 0.12% ORAL RINSE 15 ML: 1.2 SOLUTION DENTAL at 08:23

## 2024-01-01 RX ADMIN — ALBUTEROL SULFATE 2.5 MG: 2.5 SOLUTION RESPIRATORY (INHALATION) at 01:09

## 2024-01-01 RX ADMIN — SODIUM CHLORIDE 30 ML/HR: 3 INJECTION, SOLUTION INTRAVENOUS at 21:23

## 2024-01-01 RX ADMIN — MINERAL OIL, WHITE PETROLATUM: .03; .94 OINTMENT OPHTHALMIC at 22:49

## 2024-01-01 RX ADMIN — LABETALOL HYDROCHLORIDE 10 MG: 5 INJECTION INTRAVENOUS at 16:09

## 2024-01-01 RX ADMIN — FAMOTIDINE 20 MG: 10 INJECTION, SOLUTION INTRAVENOUS at 08:03

## 2024-01-01 RX ADMIN — LEVETIRACETAM 1000 MG: 100 INJECTION INTRAVENOUS at 18:27

## 2024-01-01 RX ADMIN — SODIUM CHLORIDE: 9 INJECTION, SOLUTION INTRAVENOUS at 20:26

## 2024-01-01 RX ADMIN — FAMOTIDINE 20 MG: 10 INJECTION, SOLUTION INTRAVENOUS at 09:12

## 2024-01-01 RX ADMIN — POLYVINYL ALCOHOL, POVIDONE 1 DROP: 14; 6 SOLUTION/ DROPS OPHTHALMIC at 08:23

## 2024-01-01 RX ADMIN — SODIUM CHLORIDE 5 MG/HR: 9 INJECTION, SOLUTION INTRAVENOUS at 17:04

## 2024-01-01 RX ADMIN — AMPICILLIN SODIUM AND SULBACTAM SODIUM 3000 MG: 2; 1 INJECTION, POWDER, FOR SOLUTION INTRAMUSCULAR; INTRAVENOUS at 14:28

## 2024-01-01 RX ADMIN — AMPICILLIN SODIUM AND SULBACTAM SODIUM 3000 MG: 2; 1 INJECTION, POWDER, FOR SOLUTION INTRAMUSCULAR; INTRAVENOUS at 03:18

## 2024-01-01 RX ADMIN — MAGNESIUM SULFATE HEPTAHYDRATE 2000 MG: 40 INJECTION, SOLUTION INTRAVENOUS at 03:25

## 2024-01-01 RX ADMIN — SODIUM CHLORIDE, PRESERVATIVE FREE 10 ML: 5 INJECTION INTRAVENOUS at 20:07

## 2024-01-01 RX ADMIN — SODIUM CHLORIDE 30 ML/HR: 3 INJECTION, SOLUTION INTRAVENOUS at 03:58

## 2024-01-01 RX ADMIN — POLYVINYL ALCOHOL, POVIDONE 1 DROP: 14; 6 SOLUTION/ DROPS OPHTHALMIC at 16:16

## 2024-01-01 RX ADMIN — MINERAL OIL, WHITE PETROLATUM: .03; .94 OINTMENT OPHTHALMIC at 16:01

## 2024-01-01 RX ADMIN — MINERAL OIL, WHITE PETROLATUM: .03; .94 OINTMENT OPHTHALMIC at 17:18

## 2024-01-01 RX ADMIN — Medication 7 MCG/MIN: at 16:00

## 2024-01-01 RX ADMIN — AMPICILLIN SODIUM AND SULBACTAM SODIUM 3000 MG: 2; 1 INJECTION, POWDER, FOR SOLUTION INTRAMUSCULAR; INTRAVENOUS at 02:44

## 2024-01-01 RX ADMIN — POTASSIUM CHLORIDE 20 MEQ: 29.8 INJECTION, SOLUTION INTRAVENOUS at 04:02

## 2024-01-01 RX ADMIN — FAMOTIDINE 20 MG: 10 INJECTION, SOLUTION INTRAVENOUS at 08:23

## 2024-01-01 RX ADMIN — POTASSIUM CHLORIDE 20 MEQ: 29.8 INJECTION, SOLUTION INTRAVENOUS at 20:35

## 2024-01-01 RX ADMIN — MINERAL OIL, WHITE PETROLATUM: .03; .94 OINTMENT OPHTHALMIC at 19:52

## 2024-01-01 RX ADMIN — MINERAL OIL, WHITE PETROLATUM: .03; .94 OINTMENT OPHTHALMIC at 06:17

## 2024-01-01 RX ADMIN — Medication 5 MCG/MIN: at 04:31

## 2024-01-01 RX ADMIN — AMPICILLIN SODIUM AND SULBACTAM SODIUM 3000 MG: 2; 1 INJECTION, POWDER, FOR SOLUTION INTRAMUSCULAR; INTRAVENOUS at 03:48

## 2024-01-01 RX ADMIN — LEVETIRACETAM 1000 MG: 100 INJECTION INTRAVENOUS at 06:18

## 2024-01-01 RX ADMIN — MINERAL OIL, WHITE PETROLATUM: .03; .94 OINTMENT OPHTHALMIC at 00:41

## 2024-01-01 RX ADMIN — DEXTROSE MONOHYDRATE: 50 INJECTION, SOLUTION INTRAVENOUS at 06:50

## 2024-01-01 RX ADMIN — LABETALOL HYDROCHLORIDE 10 MG: 5 INJECTION INTRAVENOUS at 10:17

## 2024-01-01 RX ADMIN — FAMOTIDINE 20 MG: 10 INJECTION, SOLUTION INTRAVENOUS at 07:26

## 2024-01-01 RX ADMIN — MINERAL OIL, WHITE PETROLATUM: .03; .94 OINTMENT OPHTHALMIC at 19:30

## 2024-01-01 RX ADMIN — PROPOFOL 40 MCG/KG/MIN: 10 INJECTION, EMULSION INTRAVENOUS at 07:53

## 2024-01-01 RX ADMIN — HYDRALAZINE HYDROCHLORIDE 10 MG: 20 INJECTION, SOLUTION INTRAMUSCULAR; INTRAVENOUS at 01:17

## 2024-01-01 RX ADMIN — MIDAZOLAM HYDROCHLORIDE 2 MG: 1 INJECTION, SOLUTION INTRAMUSCULAR; INTRAVENOUS at 21:50

## 2024-01-01 RX ADMIN — FAMOTIDINE 20 MG: 10 INJECTION, SOLUTION INTRAVENOUS at 20:23

## 2024-01-01 RX ADMIN — HYDRALAZINE HYDROCHLORIDE 10 MG: 20 INJECTION, SOLUTION INTRAMUSCULAR; INTRAVENOUS at 01:34

## 2024-01-01 RX ADMIN — ALBUTEROL SULFATE 2.5 MG: 2.5 SOLUTION RESPIRATORY (INHALATION) at 19:58

## 2024-01-01 RX ADMIN — MINERAL OIL, WHITE PETROLATUM: .03; .94 OINTMENT OPHTHALMIC at 20:26

## 2024-01-01 RX ADMIN — SODIUM CHLORIDE: 9 INJECTION, SOLUTION INTRAVENOUS at 16:58

## 2024-01-01 RX ADMIN — POLYVINYL ALCOHOL, POVIDONE 1 DROP: 14; 6 SOLUTION/ DROPS OPHTHALMIC at 12:13

## 2024-01-01 RX ADMIN — MINERAL OIL, WHITE PETROLATUM: .03; .94 OINTMENT OPHTHALMIC at 03:25

## 2024-01-01 RX ADMIN — ALBUTEROL SULFATE 2.5 MG: 2.5 SOLUTION RESPIRATORY (INHALATION) at 23:19

## 2024-01-01 RX ADMIN — AMPICILLIN SODIUM AND SULBACTAM SODIUM 3000 MG: 2; 1 INJECTION, POWDER, FOR SOLUTION INTRAMUSCULAR; INTRAVENOUS at 08:06

## 2024-01-01 RX ADMIN — CALCIUM CHLORIDE INJECTION 1000 MG: 100 INJECTION, SOLUTION INTRAVENOUS at 11:06

## 2024-01-01 RX ADMIN — SODIUM CHLORIDE, PRESERVATIVE FREE 10 ML: 5 INJECTION INTRAVENOUS at 08:24

## 2024-01-01 RX ADMIN — MINERAL OIL, WHITE PETROLATUM: .03; .94 OINTMENT OPHTHALMIC at 20:07

## 2024-01-01 RX ADMIN — PROPOFOL 35 MCG/KG/MIN: 10 INJECTION, EMULSION INTRAVENOUS at 17:31

## 2024-01-01 RX ADMIN — HYDRALAZINE HYDROCHLORIDE 10 MG: 20 INJECTION, SOLUTION INTRAMUSCULAR; INTRAVENOUS at 07:18

## 2024-01-01 RX ADMIN — HYDRALAZINE HYDROCHLORIDE 10 MG: 20 INJECTION, SOLUTION INTRAMUSCULAR; INTRAVENOUS at 02:30

## 2024-01-01 RX ADMIN — FAMOTIDINE 20 MG: 10 INJECTION, SOLUTION INTRAVENOUS at 20:07

## 2024-01-01 RX ADMIN — DEXTROSE MONOHYDRATE: 50 INJECTION, SOLUTION INTRAVENOUS at 09:20

## 2024-01-01 RX ADMIN — MINERAL OIL, WHITE PETROLATUM: .03; .94 OINTMENT OPHTHALMIC at 03:18

## 2024-01-01 RX ADMIN — LEVETIRACETAM 1000 MG: 100 INJECTION INTRAVENOUS at 05:18

## 2024-01-01 RX ADMIN — SODIUM CHLORIDE, PRESERVATIVE FREE 10 ML: 5 INJECTION INTRAVENOUS at 20:25

## 2024-01-01 RX ADMIN — POLYVINYL ALCOHOL, POVIDONE 1 DROP: 14; 6 SOLUTION/ DROPS OPHTHALMIC at 16:23

## 2024-01-01 RX ADMIN — DEXTROSE MONOHYDRATE: 50 INJECTION, SOLUTION INTRAVENOUS at 21:44

## 2024-01-01 RX ADMIN — FUROSEMIDE 40 MG: 10 INJECTION, SOLUTION INTRAMUSCULAR; INTRAVENOUS at 22:43

## 2024-01-01 RX ADMIN — ALBUTEROL SULFATE 2.5 MG: 2.5 SOLUTION RESPIRATORY (INHALATION) at 08:58

## 2024-01-01 RX ADMIN — ALBUTEROL SULFATE 2.5 MG: 2.5 SOLUTION RESPIRATORY (INHALATION) at 04:40

## 2024-01-01 RX ADMIN — FAMOTIDINE 20 MG: 10 INJECTION, SOLUTION INTRAVENOUS at 20:03

## 2024-01-01 RX ADMIN — POLYVINYL ALCOHOL, POVIDONE 1 DROP: 14; 6 SOLUTION/ DROPS OPHTHALMIC at 07:58

## 2024-01-01 RX ADMIN — PROPOFOL 35 MCG/KG/MIN: 10 INJECTION, EMULSION INTRAVENOUS at 01:33

## 2024-01-01 RX ADMIN — LEVETIRACETAM 1000 MG: 100 INJECTION INTRAVENOUS at 17:43

## 2024-01-01 RX ADMIN — CHLORHEXIDINE GLUCONATE, 0.12% ORAL RINSE 15 ML: 1.2 SOLUTION DENTAL at 20:23

## 2024-01-01 RX ADMIN — CHLORHEXIDINE GLUCONATE, 0.12% ORAL RINSE 15 ML: 1.2 SOLUTION DENTAL at 07:26

## 2024-01-01 RX ADMIN — SODIUM CHLORIDE, PRESERVATIVE FREE 10 ML: 5 INJECTION INTRAVENOUS at 07:46

## 2024-01-01 RX ADMIN — AMPICILLIN SODIUM AND SULBACTAM SODIUM 3000 MG: 2; 1 INJECTION, POWDER, FOR SOLUTION INTRAMUSCULAR; INTRAVENOUS at 20:01

## 2024-01-01 RX ADMIN — LABETALOL HYDROCHLORIDE 10 MG: 5 INJECTION INTRAVENOUS at 07:04

## 2024-01-01 RX ADMIN — LEVETIRACETAM 1000 MG: 100 INJECTION INTRAVENOUS at 05:54

## 2024-01-01 RX ADMIN — AMPICILLIN SODIUM AND SULBACTAM SODIUM 3000 MG: 2; 1 INJECTION, POWDER, FOR SOLUTION INTRAMUSCULAR; INTRAVENOUS at 16:14

## 2024-01-01 RX ADMIN — CHLORHEXIDINE GLUCONATE, 0.12% ORAL RINSE 15 ML: 1.2 SOLUTION DENTAL at 20:07

## 2024-01-01 RX ADMIN — ACETAMINOPHEN 650 MG: 325 TABLET ORAL at 15:26

## 2024-01-01 RX ADMIN — CHLORHEXIDINE GLUCONATE, 0.12% ORAL RINSE 15 ML: 1.2 SOLUTION DENTAL at 19:52

## 2024-01-01 RX ADMIN — SODIUM CHLORIDE, PRESERVATIVE FREE 10 ML: 5 INJECTION INTRAVENOUS at 07:58

## 2024-01-01 RX ADMIN — SODIUM CHLORIDE, PRESERVATIVE FREE 10 ML: 5 INJECTION INTRAVENOUS at 09:13

## 2024-01-01 RX ADMIN — MINERAL OIL, WHITE PETROLATUM: .03; .94 OINTMENT OPHTHALMIC at 12:51

## 2024-01-01 RX ADMIN — CHLORHEXIDINE GLUCONATE, 0.12% ORAL RINSE 15 ML: 1.2 SOLUTION DENTAL at 08:11

## 2024-01-01 RX ADMIN — MINERAL OIL, WHITE PETROLATUM: .03; .94 OINTMENT OPHTHALMIC at 03:48

## 2024-01-01 RX ADMIN — AMPICILLIN SODIUM AND SULBACTAM SODIUM 3000 MG: 2; 1 INJECTION, POWDER, FOR SOLUTION INTRAMUSCULAR; INTRAVENOUS at 09:21

## 2024-01-01 RX ADMIN — MINERAL OIL, WHITE PETROLATUM: .03; .94 OINTMENT OPHTHALMIC at 22:15

## 2024-01-01 RX ADMIN — POLYVINYL ALCOHOL, POVIDONE 1 DROP: 14; 6 SOLUTION/ DROPS OPHTHALMIC at 20:04

## 2024-01-01 RX ADMIN — LEVETIRACETAM 1000 MG: 100 INJECTION INTRAVENOUS at 18:23

## 2024-01-01 RX ADMIN — HYDRALAZINE HYDROCHLORIDE 10 MG: 20 INJECTION, SOLUTION INTRAMUSCULAR; INTRAVENOUS at 00:41

## 2024-01-01 RX ADMIN — AMPICILLIN SODIUM AND SULBACTAM SODIUM 3000 MG: 2; 1 INJECTION, POWDER, FOR SOLUTION INTRAMUSCULAR; INTRAVENOUS at 15:17

## 2024-01-01 RX ADMIN — CHLORHEXIDINE GLUCONATE, 0.12% ORAL RINSE 15 ML: 1.2 SOLUTION DENTAL at 07:46

## 2024-01-01 RX ADMIN — PROPOFOL 40 MCG/KG/MIN: 10 INJECTION, EMULSION INTRAVENOUS at 00:54

## 2024-01-01 RX ADMIN — Medication 5 MCG/MIN: at 16:15

## 2024-01-01 RX ADMIN — LEVETIRACETAM 1000 MG: 100 INJECTION INTRAVENOUS at 20:23

## 2024-01-01 RX ADMIN — SODIUM CHLORIDE: 9 INJECTION, SOLUTION INTRAVENOUS at 15:59

## 2024-01-01 RX ADMIN — SODIUM CHLORIDE: 9 INJECTION, SOLUTION INTRAVENOUS at 16:08

## 2024-01-01 RX ADMIN — HYDRALAZINE HYDROCHLORIDE 10 MG: 20 INJECTION, SOLUTION INTRAMUSCULAR; INTRAVENOUS at 13:24

## 2024-01-01 RX ADMIN — LORAZEPAM 2 MG: 2 INJECTION INTRAMUSCULAR; INTRAVENOUS at 13:48

## 2024-01-01 RX ADMIN — AMPICILLIN SODIUM AND SULBACTAM SODIUM 3000 MG: 2; 1 INJECTION, POWDER, FOR SOLUTION INTRAMUSCULAR; INTRAVENOUS at 07:51

## 2024-01-01 RX ADMIN — Medication: at 09:45

## 2024-01-01 RX ADMIN — FENTANYL CITRATE 100 MCG: 50 INJECTION INTRAMUSCULAR; INTRAVENOUS at 17:29

## 2024-01-01 RX ADMIN — HYDRALAZINE HYDROCHLORIDE 10 MG: 20 INJECTION, SOLUTION INTRAMUSCULAR; INTRAVENOUS at 10:33

## 2024-01-01 RX ADMIN — POTASSIUM CHLORIDE 20 MEQ: 29.8 INJECTION, SOLUTION INTRAVENOUS at 22:24

## 2024-01-01 RX ADMIN — LABETALOL HYDROCHLORIDE 10 MG: 5 INJECTION INTRAVENOUS at 01:31

## 2024-01-01 RX ADMIN — AMPICILLIN SODIUM AND SULBACTAM SODIUM 3000 MG: 2; 1 INJECTION, POWDER, FOR SOLUTION INTRAMUSCULAR; INTRAVENOUS at 22:08

## 2024-01-01 RX ADMIN — LABETALOL HYDROCHLORIDE 10 MG: 5 INJECTION INTRAVENOUS at 04:26

## 2024-01-01 RX ADMIN — SODIUM CHLORIDE 25 ML/HR: 3 INJECTION, SOLUTION INTRAVENOUS at 17:53

## 2024-01-01 RX ADMIN — MINERAL OIL, WHITE PETROLATUM: .03; .94 OINTMENT OPHTHALMIC at 23:36

## 2024-01-01 RX ADMIN — PROPOFOL 50 MCG/KG/MIN: 10 INJECTION, EMULSION INTRAVENOUS at 21:27

## 2024-01-01 RX ADMIN — LEVETIRACETAM 1000 MG: 100 INJECTION INTRAVENOUS at 18:24

## 2024-01-01 RX ADMIN — CALCIUM GLUCONATE 2000 MG: 98 INJECTION, SOLUTION INTRAVENOUS at 10:50

## 2024-01-01 RX ADMIN — CHLORHEXIDINE GLUCONATE, 0.12% ORAL RINSE 15 ML: 1.2 SOLUTION DENTAL at 09:12

## 2024-01-01 ASSESSMENT — PULMONARY FUNCTION TESTS
PIF_VALUE: 30
PIF_VALUE: 31
PIF_VALUE: 27
PIF_VALUE: 30
PIF_VALUE: 31
PIF_VALUE: 26
PIF_VALUE: 27
PIF_VALUE: 30
PIF_VALUE: 27
PIF_VALUE: 26
PIF_VALUE: 32
PIF_VALUE: 28
PIF_VALUE: 31
PIF_VALUE: 31
PIF_VALUE: 29
PIF_VALUE: 32
PIF_VALUE: 28
PIF_VALUE: 25
PIF_VALUE: 27
PIF_VALUE: 28
PIF_VALUE: 31
PIF_VALUE: 31
PIF_VALUE: 25
PIF_VALUE: 32
PIF_VALUE: 27
PIF_VALUE: 27
PIF_VALUE: 31
PIF_VALUE: 42
PIF_VALUE: 27
PIF_VALUE: 27
PIF_VALUE: 28
PIF_VALUE: 9
PIF_VALUE: 29
PIF_VALUE: 28
PIF_VALUE: 25
PIF_VALUE: 33
PIF_VALUE: 31
PIF_VALUE: 31
PIF_VALUE: 27
PIF_VALUE: 31
PIF_VALUE: 40
PIF_VALUE: 28
PIF_VALUE: 38
PIF_VALUE: 33
PIF_VALUE: 32
PIF_VALUE: 25
PIF_VALUE: 31
PIF_VALUE: 28
PIF_VALUE: 30
PIF_VALUE: 30
PIF_VALUE: 31
PIF_VALUE: 28
PIF_VALUE: 29
PIF_VALUE: 32
PIF_VALUE: 25
PIF_VALUE: 25
PIF_VALUE: 30
PIF_VALUE: 33
PIF_VALUE: 18
PIF_VALUE: 27
PIF_VALUE: 25
PIF_VALUE: 27
PIF_VALUE: 25
PIF_VALUE: 30
PIF_VALUE: 27
PIF_VALUE: 30
PIF_VALUE: 28
PIF_VALUE: 27
PIF_VALUE: 26
PIF_VALUE: 27
PIF_VALUE: 30
PIF_VALUE: 32
PIF_VALUE: 12
PIF_VALUE: 27
PIF_VALUE: 28
PIF_VALUE: 26
PIF_VALUE: 27
PIF_VALUE: 26
PIF_VALUE: 31
PIF_VALUE: 30
PIF_VALUE: 28
PIF_VALUE: 31
PIF_VALUE: 34
PIF_VALUE: 28
PIF_VALUE: 31
PIF_VALUE: 31
PIF_VALUE: 25
PIF_VALUE: 36
PIF_VALUE: 26
PIF_VALUE: 23
PIF_VALUE: 27
PIF_VALUE: 26
PIF_VALUE: 26
PIF_VALUE: 32
PIF_VALUE: 29
PIF_VALUE: 27
PIF_VALUE: 31
PIF_VALUE: 32
PIF_VALUE: 1
PIF_VALUE: 32
PIF_VALUE: 38
PIF_VALUE: 35
PIF_VALUE: 26
PIF_VALUE: 31
PIF_VALUE: 28
PIF_VALUE: 26
PIF_VALUE: 28
PIF_VALUE: 32
PIF_VALUE: 32
PIF_VALUE: 54
PIF_VALUE: 26
PIF_VALUE: 32
PIF_VALUE: 26
PIF_VALUE: 27
PIF_VALUE: 29
PIF_VALUE: 30
PIF_VALUE: 28
PIF_VALUE: 30
PIF_VALUE: 31
PIF_VALUE: 32
PIF_VALUE: 26
PIF_VALUE: 28
PIF_VALUE: 31
PIF_VALUE: 26
PIF_VALUE: 31
PIF_VALUE: 26
PIF_VALUE: 28
PIF_VALUE: 27
PIF_VALUE: 27
PIF_VALUE: 30
PIF_VALUE: 32
PIF_VALUE: 28
PIF_VALUE: 31
PIF_VALUE: 31
PIF_VALUE: 30
PIF_VALUE: 27
PIF_VALUE: 29
PIF_VALUE: 31
PIF_VALUE: 26
PIF_VALUE: 27
PIF_VALUE: 31
PIF_VALUE: 27
PIF_VALUE: 26
PIF_VALUE: 31
PIF_VALUE: 31
PIF_VALUE: 25
PIF_VALUE: 31
PIF_VALUE: 32
PIF_VALUE: 26
PIF_VALUE: 30
PIF_VALUE: 31
PIF_VALUE: 27
PIF_VALUE: 55
PIF_VALUE: 30
PIF_VALUE: 31
PIF_VALUE: 50
PIF_VALUE: 31
PIF_VALUE: 30
PIF_VALUE: 27
PIF_VALUE: 31
PIF_VALUE: 25
PIF_VALUE: 31
PIF_VALUE: 33
PIF_VALUE: 31

## 2024-01-01 ASSESSMENT — PAIN SCALES - GENERAL
PAINLEVEL_OUTOF10: 0

## 2024-02-21 PROBLEM — I61.9 INTRAPARENCHYMAL HEMORRHAGE OF BRAIN (HCC): Status: ACTIVE | Noted: 2024-01-01

## 2024-02-21 NOTE — ED NOTES
Pt being log rolled using spinal precautions. No step offs, no deformities, no signs of trauma to cervical spine, Thoracic spine, and lumbar spine.

## 2024-02-21 NOTE — CARE COORDINATION
Social Work /Transition of Care:    Pt presents to the ED via [facility] as a trauma team after becoming dizzy and falling to the ground at her mother's house.  Pt's family reports pt did not hit her head but had slurred speech and left sided weakness.  Family reports pt also had an episode of emesis and was incontinent.  Per [facility], pt had a seizure.  Pt was intubated prior to arrival to the ED.    Pt's mother, , and children remain in ED waiting room.

## 2024-02-21 NOTE — H&P
TRAUMA HISTORY & PHYSICAL  Attending/Surgical Resident/Advance Practice Nurse  2/21/2024  4:28 PM    PRIMARY SURVEY    CHIEF COMPLAINT:  Trauma team.    Injury occurred just prior to arrival. Patient found down outside by her . She had left sided paralysis and right sided gaze deviation. Reported seizure activity and patient was intubated by EMS. She had another seizure on the way to transport. Given rocuronium and etomidate for intubation as well as 2 g keppra, 100 mg ketamine.     AIRWAY:   Airway Abnormal  ETT present    EMS ETT Present - 7.0 tube confirmed by end tidal  Noisy respirations Absent  Retractions: Absent  Vomiting/bleeding: Present    BREATHING:    Midaxillary breath sound left:  Diminished  Midaxillary breath sound right:  Diminished    Cough sound intensity:   Intubated    FiO2:  Intubated. FiO2 100%    SMI Unable to obtain mL.     CIRCULATION:   Femoral pulse intensity: Strong  Palpebral conjunctiva: Pink     Vitals:    02/21/24 1617   BP:    Pulse:    Resp:    Temp: (!) 96.2 °F (35.7 °C)   SpO2:        Vitals:    02/21/24 1609 02/21/24 1612 02/21/24 1617 02/21/24 1617   BP:   (!) 252/131    Pulse: 59 59 60    Resp: 16 16 20    Temp:    (!) 96.2 °F (35.7 °C)   SpO2: 100% 100% 100%         FAST EXAM: Deferred    Central Nervous System    GCS Initial 15 minutes   Eye  Motor  Verbal 1 - Does not open eyes  1 - No motor response to pain  1 - Makes no noise 1 - Does not open eyes  1 - No motor response to pain  1 - Makes no noise     Neuromuscular blockade: Yes  Pupil size:  Left 2 mm    Right 2 mm  Pupil reaction: Sluggish    Wiggles fingers: Left No Right No  Wiggles toes: Left No   Right No    Hand grasp:   Left  Absent      Right  Absent  Plantar flexion: Left  Absent      Right   Absent    Loss of consciousness:  Yes     History Obtained From:  Patient & EMS  Private Medical Doctor: No primary care provider on file.    Pre-exisiting Medical History:  unknown    Conditions:

## 2024-02-21 NOTE — PROCEDURES
Gina Bedoya is a 53 y.o. female patient.  1. Intraparenchymal hemorrhage of brain (HCC)      No past medical history on file.  Blood pressure (!) 242/142, pulse 60, temperature (!) 96.3 °F (35.7 °C), temperature source Bladder, resp. rate 20, height 1.626 m (5' 4\"), weight 110.3 kg (243 lb 2.7 oz), SpO2 99 %.    Central Line    Date/Time: 2/21/2024 5:38 PM    Performed by: Irasema Sullivan MD  Authorized by: Kika Ku MD  Consent: The procedure was performed in an emergent situation.  Indications: vascular access  Preparation: skin prepped with ChloraPrep  Skin prep agent dried: skin prep agent completely dried prior to procedure  Sterile barriers: all five maximum sterile barriers used - cap, mask, sterile gown, sterile gloves, and large sterile sheet  Hand hygiene: hand hygiene performed prior to central venous catheter insertion  Location details: right femoral  Patient position: flat  Catheter type: triple lumen  Catheter size: 7 Fr  Pre-procedure: landmarks identified  Ultrasound guidance: no  Number of attempts: 2  Post-procedure: line sutured and dressing applied  Assessment: blood return through all ports and free fluid flow  Patient tolerance: patient tolerated the procedure well with no immediate complications  Comments: Dr. Ku was present and immediately available for the entirety of the procedure        Irasema Sullivan MD  2/21/2024

## 2024-02-21 NOTE — ED NOTES
No signs of trauma to chest and abdomen is soft. No signs of trauma to lower extremities. No signs of trauma to upper extremities.

## 2024-02-21 NOTE — PROGRESS NOTES
May catheter inserted using sterile technique per physician's order. May Catheter Indication: Critical Care Fluid Volume Management Upon insertion, 400 mL of urine returned. Securing device applied. May bag is hanging below the level of the bladder, safety clip attached to the bed sheet, tamper seal is intact, drainage bag is not on the floor, lack of dependent loop in tubing, and the drainage bag is less than half full.

## 2024-02-21 NOTE — PROCEDURES
Gina Bedoya is a 53 y.o. female patient.  No diagnosis found.  No past medical history on file.  Blood pressure (!) 242/142, pulse 60, temperature (!) 96.3 °F (35.7 °C), temperature source Bladder, resp. rate 20, height 1.626 m (5' 4\"), weight 110.3 kg (243 lb 2.7 oz), SpO2 99 %.    Insert Arterial Line    Date/Time: 2/21/2024 5:37 PM    Performed by: Irasema Sullivan MD  Authorized by: Kika Ku MD  Consent: The procedure was performed in an emergent situation.  Preparation: Patient was prepped and draped in the usual sterile fashion.  Indications: multiple ABGs and hemodynamic monitoring  Location: left radial  Seldinger technique: Seldinger technique used  Number of attempts: 1  Post-procedure: line sutured and dressing applied  Patient tolerance: patient tolerated the procedure well with no immediate complications  Comments: Dr. Ku was available for the entirety of the procedure.         Irasema Sullivan MD  2/21/2024

## 2024-02-21 NOTE — PROGRESS NOTES
4 Eyes Skin Assessment     NAME:  Gina Bedoya  YOB: 1970  MEDICAL RECORD NUMBER:  [id number]    The patient is being assessed for  Admission    I agree that at least one RN has performed a thorough Head to Toe Skin Assessment on the patient. ALL assessment sites listed below have been assessed.      Areas assessed by both nurses:    Head, Face, Ears, Shoulders, Back, Chest, Arms, Elbows, Hands, Sacrum. Buttock, Coccyx, Ischium, Legs. Feet and Heels, Under Medical Devices , and Other ***        Does the Patient have a Wound? {Action Wound:70593}       Rodrigo Prevention initiated by RN: {YES/NO:19726}  Wound Care Orders initiated by RN: {YES/NO:19726}    Pressure Injury (Stage 3,4, Unstageable, DTI, NWPT, and Complex wounds) if present, place Wound referral order by RN under : {YES/NO:19726}    New Ostomies, if present place, Ostomy referral order under : {YES/NO:19726}     Nurse 1 eSignature: Electronically signed by Ninoska Browne RN on 2/21/24 at 5:19 PM EST    **SHARE this note so that the co-signing nurse can place an eSignature**    Nurse 2 eSignature: {Esignature:[id number]}

## 2024-02-21 NOTE — ED NOTES
54y/o found outside by  per [facility] w/complete left sided paralysis w/left sided gaze. Per [facility] nystagmus on presentation w/seizure activity.  and 6mg versed, 100mg perry, and 100mg ketamine given for intubation by [facility]. LKW 1447 this afternoon.

## 2024-02-22 PROBLEM — Z71.89 GOALS OF CARE, COUNSELING/DISCUSSION: Status: ACTIVE | Noted: 2024-01-01

## 2024-02-22 PROBLEM — Z51.5 ENCOUNTER FOR PALLIATIVE CARE: Status: ACTIVE | Noted: 2024-01-01

## 2024-02-22 NOTE — CONSULTS
I was contacted by the emergency department earlier today to review/discuss about this stroke alert case in the setting of trauma.  The last known well time was reported as an hour ago and the patient was then found on the ground.  There was also witnessed seizure activity.  Given dense left-sided hemiplegia and right gaze deviation, concern for cortical intracranial pathology was raised.  I was informed of the relevant medical history and the presenting complaints and I myself did pertinent medical chart review.  I have personally reviewed the neuroimaging. No face-to-face interaction with the patient was done.    Personal review of neuroimaging shows large right sided intraparenchymal hematoma at the level of the basal ganglia with significant edema and intraventricular extension into the fourth ventricle and the occipital horn of the left lateral ventricle.  There is some early evidence of hydrocephalus.  Also there was evidence of midline shift towards the left.  No evidence of any intracranial aneurysm and/or vascular malformation uncovered on CTA head and neck.    Assessment/Plan:    Large right-sided intraparenchymal hemorrhage with intraventricular extension.  Etiology hypertension.  ICH score is 4 meaning 97% mortality at 30-day.  Unfortunately this hemorrhage does not seem to be survivable.  There is already evidence of midline shift to the left along with blood in the fourth ventricle.    Please lower the systolic blood pressure to less than 140 mmHg.  Load with Keppra.  No blood thinners at all.  Emergently contact neurosurgery service for their recommendations.  No further endovascular workup is needed.  Please call me with any additional questions/concerns.      Total time spent reviewing the pertinent clinical presentation, review of neuroimaging, time spent in medical decision making and discussion of case with the physicians involved in the acute care was 32 mins.      Alexandre Pretty, 
    4:26 PM- Call placed to patient's nurse, Estuardo, and updated.        OBJECTIVE:   Prognosis: Poor    No past medical history on file.    No past surgical history on file.    No family history on file.    No Known Allergies    ROS: UNABLE TO OBTAIN ROS DUE TO INTUBATED/SEDATED    Physical Exam:  BP (!) 120/55   Pulse 75   Temp 100 °F (37.8 °C) (Bladder)   Resp 20   Ht 1.626 m (5' 4\")   Wt 112 kg (246 lb 14.6 oz)   SpO2 100%   BMI 42.38 kg/m²     Gen:  52yo female lying in bed, intubated/sedated.  HEENT:  Normocephalic. Conjunctiva clear. Pupils unequal. No eye drainage.   Neck:  Trachea midline. No JVD  Lungs:  Intubated on mechanical ventilation. Ventilated breath sounds bilaterally.   Heart: RRR, no murmur auscultated.   Abd:  BS present. Soft, no distension. No tenderness to palpation.   M/S/Ext:  No edema   Skin:  Warm and dry.  Neuro:  Intubated/sedated. Does not open eyes. Does not follow commands. Pupils unequal and nonreactive. Unable to fully assess CN's II-XII due to current condition. No active tremulous or seizure-like activity.  Psych: No agitation observed.     Objective data reviewed: labs, images, records, medication use, vitals, and chart    CXR 02/22/24  FINDINGS:   Stable positioning of ET tube tip.     The lungs are without acute focal process.  There is no effusion or   pneumothorax. The cardiomediastinal silhouette is without acute process. The   osseous structures are without acute process.  Enteric tube tip in the body   of the stomach.       Impression:       Stable appearance of the chest compared to 02/21/2024.      CT HEAD WO 02/21/24  Impression:        Overall stable hemorrhagic event within the ventricular extension and   significant mass effect centered in the right basal ganglia region as above   described without significant interval changes since the study of February 21st same day performed early at 16:34 p.m..      CT CHEST/ABD/PELVIS 02/21/24  Impression:        1. No 
and intraventricular extension    Plan:  Patient unfortunately suffered a significant hypertensive bleed. Realistic expectations and prognosis discussed with family  No surgical intervention   Continue current care  Repeat head CT       Electronically signed by Ofelia Villanueva PA-C on 2/21/2024 at 6:43 PM     I have examined the patient personally and agree with above.  She has ICH and IVH.  Prognosis is poor.  I spent over 50 mins on medical decision making and in discussing her condition with her family.  No surgical intervention    Julio Rushing MD'

## 2024-02-22 NOTE — PROGRESS NOTES
Patient admitted to SICU with the following belongings:  Jewelry, Pants, and Shirt. The following belongings admitted with the patient, ALL, were sent home with the patient's family.      Rings x 2, earrings, shirt, pants, and undergarments.

## 2024-02-22 NOTE — PROGRESS NOTES
This RN consulted [facility] at 2100. Kristina [facility] representative is in route to hospital.

## 2024-02-22 NOTE — CARE COORDINATION
2/22 Care Coordination: Pt remains in SICU, Cont on vent.   Workup demonstrated large intracerebral hemorrhage.  She remains deeply comatose. Pt's family made Code Status DNR-CCA.  With Current status unclear on discharge needs. Palliative Care consult.  CM/SW will continue to follow for discharge planning.   Denny BERRY,RN--BC  [phone]

## 2024-02-22 NOTE — DISCHARGE SUMMARY
the esophagus.  Endotracheal tube is present. Irregular opacities are present in the upper lung fields which may be represent subsegmental atelectasis notable on the right.  Heterogeneous appearance of the left lobe of the thyroid gland.     1. Large right intraparenchymal hematoma centered at level of right basal ganglia with surrounding edema and intraventricular extent of blood products. 2. Mass effect on right lateral ventricle as well as leftward midline shift of approximately 1 cm. 3. No intracranial arterial occlusion or obvious stenosis. 4. No stenosis involving the carotid arteries or vertebral arteries. 5. Opacities are present in the upper lung fields notable on the right which may represent subsegmental atelectasis 6. Heterogeneous appearance of left lobe of the thyroid gland.  Non emergent ultrasound follow-up recommended. 7.  Critical results were called by Dr. Erik Kumar to Dr. Ku  On 2/21/2024 at 17:26.     XR CHEST PORTABLE    Result Date: 2/21/2024  EXAMINATION: ONE XRAY VIEW OF THE CHEST 2/21/2024 4:22 pm COMPARISON: None. HISTORY: ORDERING SYSTEM PROVIDED HISTORY: trauma TECHNOLOGIST PROVIDED HISTORY: Reason for exam:->trauma What reading provider will be dictating this exam?->CRC FINDINGS: Tip of the ETT is approximately 4.1 cm above the jesse.  The heart is enlarged.  The lungs are clear.  There is no pneumothorax or pleural effusion.  Mild elevation of right hemidiaphragm.     1. Tip of the ETT is approximately 4.1 cm above the jesse. 2. Cardiomegaly.       Discharge Exam:  General: Intubated. No sedation.   Eyes: Fixed and dialted. No pupil reflex. Doll's eyes  Resp: Intubated. Apneic when put on PS.   CV: Regular rate  Extremities: no purposeful movement.  Neuro: GCS 3, No gag or cough reflex.      Disposition: DCD    In process/preliminary results:  Outstanding Order Results       No orders found from 1/23/2024 to 2/22/2024.            Patient Instructions:   There are no

## 2024-02-22 NOTE — PROGRESS NOTES
Department of Neurosurgery  Progress Note    CHIEF COMPLAINT: seen for large right basal ganglia hemorrhage    SUBJECTIVE:  Intubated    REVIEW OF SYSTEMS :  Unable to obtain     OBJECTIVE:   VITALS:  BP (!) 164/71   Pulse 82   Temp (!) 100.8 °F (38.2 °C) (Bladder)   Resp 20   Ht 1.626 m (5' 4\")   Wt 112 kg (246 lb 14.6 oz)   SpO2 100%   BMI 42.38 kg/m²     PHYSICAL:  Intubated and sedated  Pupils 2mm, sluggish to react  Does not follow commands  Skin is warm  Abdomen soft    DATA:  CBC:   Lab Results   Component Value Date/Time    WBC 10.6 02/22/2024 04:05 AM    RBC 4.16 02/22/2024 04:05 AM    HGB 11.8 02/22/2024 04:05 AM    HCT 35.5 02/22/2024 04:05 AM    MCV 85.3 02/22/2024 04:05 AM    MCH 28.4 02/22/2024 04:05 AM    MCHC 33.2 02/22/2024 04:05 AM    RDW 15.0 02/22/2024 04:05 AM     02/22/2024 04:05 AM    MPV 10.6 02/22/2024 04:05 AM     BMP:    Lab Results   Component Value Date/Time     02/22/2024 05:50 PM    K 4.12 02/22/2024 04:40 AM     02/22/2024 04:05 AM    CO2 18 02/22/2024 04:05 AM    BUN 23 02/22/2024 04:05 AM    LABALBU 4.4 02/21/2024 03:53 PM    CREATININE 0.9 02/22/2024 04:05 AM    CALCIUM 8.4 02/22/2024 04:05 AM    LABGLOM >60 02/22/2024 04:05 AM    GLUCOSE 138 02/22/2024 04:05 AM     PT/INR:    Lab Results   Component Value Date/Time    PROTIME 11.1 02/21/2024 03:53 PM    INR 1.0 02/21/2024 03:53 PM     PTT:    Lab Results   Component Value Date/Time    APTT 30.7 02/21/2024 03:53 PM   [APTT}    Current Inpatient Medications  Current Facility-Administered Medications: niCARdipine (CARDENE) 25 mg in sodium chloride 0.9 % 250 mL infusion ([id number]), 2.5-15 mg/hr, IntraVENous, Continuous  sodium chloride flush 0.9 % injection 5-40 mL, 5-40 mL, IntraVENous, 2 times per day  sodium chloride flush 0.9 % injection 5-40 mL, 5-40 mL, IntraVENous, PRN  0.9 % sodium chloride infusion, , IntraVENous, PRN  ondansetron (ZOFRAN-ODT) disintegrating tablet 4 mg, 4 mg, Oral, Q8H PRN **OR**

## 2024-02-22 NOTE — PROGRESS NOTES
Palliative Medicine Social Work     Patient Name: Gina Bedoya    Age: 53 y.o.    Marital Status:      Status: no    Next of Kin:  Tomy Bedoya [phone]     Additional Support: daughter Donna, 24yo, son Benedict 17yo, pts mother and family.     Minor Children: no    Advanced Directives: no    Confirm Code Status: dnrcca    Current Goals of Care: continue current care and monitor progress    Mental Health History: no    Substance Abuse:no    Indications of Abuse/Neglect: no    Financial Concerns: no    Living Situation: pt resides with her  and family. She was independent prior to admission. She was planning her sons graduation, and her daughters upcoming wedding.     Physical Care Needs Met: pt intubated in icu    Emotional Needs Met: support provided to ts sister in laws at bedside. Pts  and mother had gone home to rest.     Assessment: 52 yo  female admitted after having gone down in front of her family and having a seizure. She is intubated. Large hemorrhagic stroke, hypertensive urgency.  LSW will follow for support.

## 2024-02-22 NOTE — ACP (ADVANCE CARE PLANNING)
Advance Care Planning   Healthcare Decision Maker:    Primary Decision Maker: [facility] - [phone]    Click here to complete Healthcare Decision Makers including selection of the Healthcare Decision Maker Relationship (ie \"Primary\").

## 2024-02-22 NOTE — PROGRESS NOTES
4 Eyes Skin Assessment     NAME:  Gina Bedoya  YOB: 1970  MEDICAL RECORD NUMBER:  [id number]    The patient is being assessed for  Admission    I agree that at least one RN has performed a thorough Head to Toe Skin Assessment on the patient. ALL assessment sites listed below have been assessed.      Areas assessed by both nurses:    Head, Face, Ears, Shoulders, Back, Chest, Arms, Elbows, Hands, Sacrum. Buttock, Coccyx, Ischium, Legs. Feet and Heels, and Under Medical Devices   Ecchymosis on L -upper lip, redness under breasts/abdominal folds and large skin tag on R - posterior calf.   Does the Patient have a Wound? No noted wound(s)       Rodrigo Prevention initiated by RN: Yes  Wound Care Orders initiated by RN: No    Pressure Injury (Stage 3,4, Unstageable, DTI, NWPT, and Complex wounds) if present, place Wound referral order by RN under : No    New Ostomies, if present place, Ostomy referral order under : No     Nurse 1 eSignature: Electronically signed by Vanessa Robbins RN on 2/21/24 at 10:40 PM EST    **SHARE this note so that the co-signing nurse can place an eSignature**    Nurse 2 eSignature: Electronically signed by Bria Morris RN on 2/21/24 at 10:28 PM EST

## 2024-02-22 NOTE — PROGRESS NOTES
Attending Physician Statement:  I have examined the patient, reviewed the record, and discussed the case with the surgical team.  I agree with the assessment and plan with the following additions, corrections, and changes.    I provided critical care to a patient with ICH and acute respiratory failure  requiring frequent and emergent imaging, lab studies, intensive monitoring, data review, and adjusting the clinical plan as well as urgent coordination with multiple specialists.     2/22-admitted to SICU overnight after being found down and unresponsive.  Workup demonstrated large intracerebral hemorrhage.  She remains deeply comatose.  No eye-opening.  Decerebrate posturing.  Right pupil 3 mm nonreactive.  Left pupil 2 mm nonreactive.  Hemodynamics and oxygenation stable.      Assessment:  Nontraumatic intracranial hemorrhage-multicompartmental (right basal ganglia, intraventricular, subarachnoid) with midline shift and cerebral edema  Seizures  Acute respiratory failure due to ICH-, CXR no acute disease, lines and tubes appropriately positioned  Incidental left thyroid lobe nodule 2.5 cm      Plan:  Serial neuro exams  Continue Keppra  As needed labetalol and hydralazine to keep SBP less than 160  Continue normal saline and hypertonic saline  Continue controlled mechanical ventilation  Monitor CBC, CMP, ABG, chest x-ray, serial sodium  DNR-CCA  Poor prognosis    I discussed case with the patient's family today.     Pt needs continuous ICU monitoring because the patient is at risk for deterioration from a neurological and respiratory standpoint.    Critical care time exclusive of teaching and procedures = 37 min

## 2024-02-22 NOTE — ED PROVIDER NOTES
Basic Metabolic Panel   Result Value Ref Range    Sodium 152 (H) 132 - 146 mmol/L    Potassium 4.0 3.5 - 5.0 mmol/L    Chloride 124 (H) 98 - 107 mmol/L    CO2 18 (L) 22 - 29 mmol/L    Anion Gap 10 7 - 16 mmol/L    Glucose 136 (H) 74 - 99 mg/dL    BUN 23 (H) 6 - 20 mg/dL    Creatinine 1.1 (H) 0.50 - 1.00 mg/dL    Est, Glom Filt Rate >60 >60 mL/min/1.73m2    Calcium 8.3 (L) 8.6 - 10.2 mg/dL   Hepatic Function Panel   Result Value Ref Range    Albumin 3.2 (L) 3.5 - 5.2 g/dL    Alkaline Phosphatase 58 35 - 104 U/L    ALT 20 0 - 32 U/L    AST 22 0 - 31 U/L    Total Bilirubin 0.2 0.0 - 1.2 mg/dL    Bilirubin, Direct <0.2 0.0 - 0.3 mg/dL    Bilirubin, Indirect Can not be calculated 0.0 - 1.0 mg/dL    Total Protein 5.6 (L) 6.4 - 8.3 g/dL   Triglyceride   Result Value Ref Range    Triglycerides 135 <150 mg/dL   TSH   Result Value Ref Range    TSH 0.74 0.27 - 4.20 uIU/mL   Cortisol Total   Result Value Ref Range    Cortisol 10.0 2.7 - 18.4 ug/dL    Cortisol Collection Info Unknown    Blood Gas, Arterial   Result Value Ref Range    Date Analyzed 20240223     Time Analyzed 0524     Source: Blood Arterial     pH, Blood Gas 7.342 (L) 7.350 - 7.450    PCO2 35.0 35.0 - 45.0 mmHg    PO2 99.2 75.0 - 100.0 mmHg    HCO3 18.5 (L) 22.0 - 26.0 mmol/L    B.E. -6.4 (L) -3.0 - 3.0 mmol/L    O2 Sat 96.3 92.0 - 98.5 %    PO2/FIO2 0.99 mmHg/%    AaDO2 553.8 mmHg    RI(T) 5.58     O2Hb 95.8 94.0 - 97.0 %    COHb 0.3 0.0 - 1.5 %    MetHb 0.2 0.0 - 1.5 %    HHb 3.7 0.0 - 5.0 %    tHb (est) 11.4 (L) 11.5 - 16.5 g/dL    Mode VC+     FIO2 100.0 %    Rr Mechanical 20.0 b/min    Vt Mechanical 450.0 mL    Peep/Cpap 8.0 cmH2O    Comment Resending results 2/23/2024 0534 and 541 AR     Date Of Collection 20240223     Time Collected 0522     Pt Temp 37.0 C     ID [id number]     Lab [id number]     Critical(s) Notified . No Critical Values    Lactic Acid   Result Value Ref Range    Lactic Acid 1.1 0.5 - 2.2 mmol/L   Basic Metabolic Panel   Result Value Ref

## 2024-02-22 NOTE — PROGRESS NOTES
02/22/24 1015   Encounter Summary   Encounter Overview/Reason  Spiritual/Emotional Needs   Service Provided For: Family   Referral/Consult From: [facility]   Support System Family members   Last Encounter  02/22/24  (dl)   Complexity of Encounter High   Spiritual/Emotional needs   Type Spiritual Support   Grief, Loss, and Adjustments   Type Anticipatory Grief   Assessment/Intervention/Outcome   Assessment Coping;Tearful   Intervention Active listening;Discussed belief system/[employer] practices/joya;Discussed illness injury and it’s impact;Explored/Affirmed feelings, thoughts, concerns;Prayer (assurance of)/[place];Nurtured Hope   Outcome Comfort;Engaged in conversation;Expressed feelings, needs, and concerns;Expressed Gratitude     Two sister-in-laws at bedside tearful. They told me she does have any meaningful movements. They spoke about how shocking it is because she was just fine and it was very sudden. I offer emotional and prayer support as she is of the [employer] joya. Please reach out for any further needs.    Chaplain Iveth Baron, [facility], [facility] Contact at Ext: [phone]

## 2024-02-22 NOTE — PROGRESS NOTES
LSW present with Palliative Care physician to meet with pts  , daughter and son. Emotional support provided as they dicussed their understanding  of pts poor prognosis. Family requests update from neuro on prognosis, expected outcomes and when decisions would need made.

## 2024-02-22 NOTE — SIGNIFICANT EVENT
Updated patient's family on her condition and went over CT scans. Extensive discussion had with family at bedside.They expressed concern that their mother was in pain and they did not want her to suffer in the event that she were to go into cardiac arrest. They would like her code status changed to DNR-CCA. Code status updated in chart.     Electronically signed by Coral Salazar MD on 2/21/2024 at 9:37 PM

## 2024-02-22 NOTE — PROGRESS NOTES
Trauma Tertiary Survey    Admit Date: 2/21/2024  Hospital day 0    CC:  Found down     Alcohol pre-screening:  Women: How many times in the past year have you had 4 or more drinks in a day? Unable to assess due to patient's status  How much do you drink on a daily basis? Unable to assess due to patient's status    Drug Pre-screening:    How many times in the past year have you used a recreational drug or used a prescription medication for non medical reasons?  Unable to assess due to patient's status    Mood Prescreening:    During the past two weeks, have you been bothered by little interest or pleasure doing things?  Unable to assess due to patient's status  During the past two weeks, have you been bothered by feeling down, depressed or hopeless?  Unable to assess due to patient's status      Scheduled Meds:   sodium chloride flush  5-40 mL IntraVENous 2 times per day    chlorhexidine  15 mL Mouth/Throat BID    famotidine (PEPCID) injection  20 mg IntraVENous BID    Polyvinyl Alcohol-Povidone PF  1 drop Both Eyes Q4H    Or    artificial tears   Both Eyes Q4H    levETIRAcetam  1,000 mg IntraVENous Q12H     Continuous Infusions:   niCARdipine Stopped (02/21/24 2236)    sodium chloride      sodium chloride 75 mL/hr at 02/22/24 0624    propofol 40 mcg/kg/min (02/22/24 0093)    3% sodium chloride 30 mL/hr (02/22/24 1938)     PRN Meds:sodium chloride flush, sodium chloride, ondansetron **OR** ondansetron, polyethylene glycol, acetaminophen **OR** acetaminophen, sodium chloride flush, hydrALAZINE, labetalol, medicated lip balm    Subjective:     Intuabted, sedated. In critical conditions. Not responsive to voice or commands.     Objective:   Patient Vitals for the past 8 hrs:   BP Temp Temp src Pulse Resp SpO2 Weight   02/22/24 0805 (!) 127/57 100 °F (37.8 °C) Bladder 75 20 100 % --   02/22/24 0800 (!) 127/57 100 °F (37.8 °C) Bladder 75 20 100 % --   02/22/24 0718 (!) 164/71 -- -- -- -- -- --   02/22/24 0704 (!) 165/72

## 2024-02-23 NOTE — PROGRESS NOTES
LSW present with Palliative Medicine physician to meet with family at bedside. Anticipatory grief support provided to daughter and pts sister in laws at bedside. , son and pts mother rwill be up this morning. Community grief resources provided per family request.

## 2024-02-23 NOTE — PROGRESS NOTES
Attending Physician Statement:  I have examined the patient, reviewed the record, and discussed the case with the surgical team.  I agree with the assessment and plan with the following additions, corrections, and changes.    I provided critical care to a patient with ICH and acute respiratory failure  requiring frequent and emergent imaging, lab studies, intensive monitoring, data review, and adjusting the clinical plan as well as urgent coordination with multiple specialists.     2/22-admitted to SICU overnight after being found down and unresponsive.  Workup demonstrated large intracerebral hemorrhage.  She remains deeply comatose.  No eye-opening.  Decerebrate posturing.  Right pupil 3 mm nonreactive.  Left pupil 2 mm nonreactive.  Hemodynamics and oxygenation stable.  2/23--showed signs of herniation last night; started on pressors; propofol stopped at 0430; no response to pain; pupils 5 mm bilaterally nonreactive; no oculocephalic reflex; no cough or gag reflex; family at bedside      Assessment:  Nontraumatic intracranial hemorrhage-multicompartmental (right basal ganglia, intraventricular, subarachnoid) with midline shift and cerebral edema  Clinically brain-dead  Seizures-none noted since prior to admission  Acute respiratory failure due to ICH-PF 99, CXR no acute disease, lines and tubes appropriately positioned  Incidental left thyroid lobe nodule 2.5 cm      Plan:  Patient has been receiving continuous sedation; therefore, clinical brain death testing will need to be delayed until tomorrow morning  Continue Keppra  Continue normal saline   Stop hypertonic saline  Continue norepinephrine  Continue controlled mechanical ventilation  Monitor CBC, CMP, ABG, chest x-ray, serial sodium  DNR-CCA  Poor prognosis    I discussed case with the patient's family today.  The family did approach me about organ donation which I briefly discussed with them.    Pt needs continuous ICU monitoring because the patient is at

## 2024-02-23 NOTE — PROGRESS NOTES
[facility] Coroner's office contacted. Patient will not be considered a case and will be released to [facility] per Ludy Arnett of [facility]. Attending will sign death certificate.

## 2024-02-23 NOTE — PROGRESS NOTES
Palliative Care Department  Palliative Care Progress Note  Provider: Abbi Joseph DO  [phone]    Hospital Day: 3  Date of Initial Consult: 02/21/2024  (Day 1)  Referring Provider: Irasema Sullivan MD  Palliative Medicine was consulted for assistance with: Assist with goals of care    Chief Complaint: Gina Bedoya is a 53 y.o. female with chief complaint of found down    HPI:   Gina Bedoya is a 53 y.o. female with no significant medical history, who was admitted to [facility] on 2/21/2024 with a hemorrhagic stroke. History is primarily obtained from chart review. It is reported the patient was found down by her  and had episode of emesis and was incontinent of urine. Prior to arrival patient had a seizure and was intubated by EMS. She was Life Flighted to [facility]. Patient was evaluated as a trauma team at [facility] ED. [facility] ED 3 T. Labs and imaging were obtained. She was found to have a large hemorrhagic stroke, acute respiratory failure, seizures and HTN emergency. She was placed on Cardene drip and admitted into the SICU for further evaluation and management. Neurosurgery was consulted, prognosis determined to be poor, and there are no plans for surgical intervention. Palliative medicine was consulted to assist with goals of care.     ASSESSMENT/PLAN:     Pertinent Hospital Diagnoses:  Current medical issues leading to Palliative Medicine involvement include   Active Hospital Problems    Diagnosis Date Noted    Encounter for palliative care [Z51.5] 02/22/2024    Goals of care, counseling/discussion [Z71.89] 02/22/2024    Intraparenchymal hemorrhage of brain (HCC) [I61.9] 02/21/2024     Large right intraparenchymal hemorrhage of the brain  Acute respiratory failure  Seizures   Left thyroid nodule- incidental finding    Palliative Care Encounter / Counseling Regarding Goals of Care:  Please see detailed goals of care discussion as below.  At this time, Gina Bedoya, Does Not

## 2024-02-24 NOTE — PROGRESS NOTES
Attending Physician Statement:  I have examined the patient, reviewed the record, and discussed the case with the surgical team.  I agree with the assessment and plan with the following additions, corrections, and changes.    I provided critical care to a patient with ICH and acute respiratory failure  requiring frequent and emergent imaging, lab studies, intensive monitoring, data review, and adjusting the clinical plan as well as urgent coordination with multiple specialists.     2/22-admitted to SICU overnight after being found down and unresponsive.  Workup demonstrated large intracerebral hemorrhage.  She remains deeply comatose.  No eye-opening.  Decerebrate posturing.  Right pupil 3 mm nonreactive.  Left pupil 2 mm nonreactive.  Hemodynamics and oxygenation stable.  2/23--showed signs of herniation last night; started on pressors; propofol stopped at 0430; no response to pain; pupils 5 mm bilaterally nonreactive; no oculocephalic reflex; no cough or gag reflex; family at bedside  2/24--received desmopressin for central DI yesterday; remains unresponsive; off all sedation/narcotics/CNS depressants since 0430 on 2/23/24      Assessment:  Nontraumatic intracranial hemorrhage-multicompartmental (right basal ganglia, intraventricular, subarachnoid) with midline shift and cerebral edema  Clinically brain-dead  Seizures-none noted since prior to admission  Acute respiratory failure due to ICH-PF 99, CXR no acute disease, lines and tubes appropriately positioned  Incidental left thyroid lobe nodule 2.5 cm      Plan:  Proceed with brain death pronouncement today

## 2024-02-25 NOTE — PROGRESS NOTES
02/25/24 0200   Patient Observation   Pulse 81   Respirations 12   SpO2 100 %   Vent Information   Ventilator Day(s) 5   Ventilator -11   Vent Mode (S)  AC/VC   Ventilator Settings   Vt (Set, mL) (S)  450 mL   Resp Rate (Set) 12 bpm   PEEP/CPAP (cmH2O) (S)  14   FiO2  (S)  90 %   Vent Patient Data (Readings)   Vt (Measured) 400 mL   Peak Inspiratory Pressure (cmH2O) 36 cmH2O   Rate Measured 12 br/min   Minute Volume (L/min) 6.51 Liters   Mean Airway Pressure (cmH2O) 19 cmH20   Plateau Pressure (cm H2O) 27 cm H2O   Driving Pressure 13   I:E Ratio 1:4.10   I Time/ I Time % 2 s   Vent Alarm Settings   High Pressure (cmH2O) 50 cmH2O     Placed patient back on AC/VC

## 2024-02-25 NOTE — PROGRESS NOTES
02/25/24 0115   Patient Observation   Pulse 81   Respirations 12   SpO2 100 %   Breath Sounds   Breath Sounds Bilateral Clear;Diminished   Right Upper Lobe Clear;Diminished   Right Middle Lobe Clear;Diminished   Right Lower Lobe Clear;Diminished   Left Upper Lobe Clear;Diminished   Left Lower Lobe Clear;Diminished   Vent Information   Ventilator Day(s) 5   Ventilator -11   Vent Mode (S)  AC/PC  (challenge gas)   $Ventilation $Subsequent Day   Ventilator Settings   Vt (Set, mL) 450 mL   Resp Rate (Set) 12 bpm   PEEP/CPAP (cmH2O) (S)  5   FiO2  (S)  100 %   Vent Patient Data (Readings)   Vt (Measured) 592 mL   Peak Inspiratory Pressure (cmH2O) 12 cmH2O   Rate Measured 12 br/min   Minute Volume (L/min) 7.49 Liters   Mean Airway Pressure (cmH2O) 11 cmH20   Plateau Pressure (cm H2O) 27 cm H2O   Driving Pressure 22   I:E Ratio (S)  1.00:1   Static Compliance (L/cm H2O) 45   I Time/ I Time % 2 s   Backup Apnea On   Vent Alarm Settings   High Pressure (cmH2O) 50 cmH2O   Low Minute Volume (lpm) 4 L/min   High Minute Volume (lpm) (S)  16 L/min   Low Exhaled Vt (ml) 350 mL   High Exhaled Vt (ml) 900 mL   RR High (bpm) 30 br/min   Apnea (secs) (S)  10 secs   Additional Respiratoray Assessments   Humidification Source Heated wire   Humidification Temp 37   Circuit Condensation Drained   Airway Clearance   Suction ET Tube   Non-Surgical Airway 02/21/24 Endotracheal tube   Placement Date/Time: 02/21/24 1533   Present on Admission/Arrival: Yes  Placed By: (c)   Airway Device: Endotracheal tube  Size: 7   Secured At 24 cm   Measured From Lips   Secured By Commercial tube augustin     Patient put on challenge gas for [employer]

## 2024-02-25 NOTE — PROGRESS NOTES
02/24/24 1951   Patient Observation   Pulse 72   Respirations 12   SpO2 100 %   Breath Sounds   Breath Sounds Bilateral Clear;Diminished   Right Upper Lobe Clear;Diminished   Right Middle Lobe Clear;Diminished   Right Lower Lobe Clear;Diminished   Left Upper Lobe Clear;Diminished   Left Lower Lobe Clear;Diminished   Vent Information   Ventilator -11   Vent Mode AC/VC   Ventilator Settings   Vt (Set, mL) 450 mL   Resp Rate (Set) 12 bpm   PEEP/CPAP (cmH2O) (S)  14   FiO2  90 %   Vent Patient Data (Readings)   Vt (Measured) 559 mL   Peak Inspiratory Pressure (cmH2O) 30 cmH2O   Rate Measured 12 br/min   Minute Volume (L/min) 6.49 Liters   Mean Airway Pressure (cmH2O) 18 cmH20   Plateau Pressure (cm H2O) 29 cm H2O   Driving Pressure 15   I:E Ratio 1:4.10   Vent Alarm Settings   High Pressure (cmH2O) 50 cmH2O   Low Minute Volume (lpm) 4 L/min   High Minute Volume (lpm) 16 L/min   Low Exhaled Vt (ml) 350 mL   High Exhaled Vt (ml) 900 mL   RR High (bpm) 30 br/min   Apnea (secs) 20 secs   Additional Respiratoray Assessments   Humidification Source Heated wire   Humidification Temp 37   Ambu Bag With Mask At Bedside Yes   Non-Surgical Airway 02/21/24 Endotracheal tube   Placement Date/Time: 02/21/24 1533   Present on Admission/Arrival: Yes  Placed By: (c)   Airway Device: Endotracheal tube  Size: 7   Secured At 24 cm   Secured Location Center     Patient placed on peep 14 1:4

## 2024-02-25 NOTE — PROGRESS NOTES
Attending Physician Statement:  I have examined the patient, reviewed the record, and discussed the case with the surgical team.  I agree with the assessment and plan with the following additions, corrections, and changes.    I provided critical care to a patient with ICH and acute respiratory failure  requiring frequent and emergent imaging, lab studies, intensive monitoring, data review, and adjusting the clinical plan as well as urgent coordination with multiple specialists.     2/22-admitted to SICU overnight after being found down and unresponsive.  Workup demonstrated large intracerebral hemorrhage.  She remains deeply comatose.  No eye-opening.  Decerebrate posturing.  Right pupil 3 mm nonreactive.  Left pupil 2 mm nonreactive.  Hemodynamics and oxygenation stable.  2/23--showed signs of herniation last night; started on pressors; propofol stopped at 0430; no response to pain; pupils 5 mm bilaterally nonreactive; no oculocephalic reflex; no cough or gag reflex; family at bedside  2/24--received desmopressin for central DI yesterday; remains unresponsive; off all sedation/narcotics/CNS depressants since 0430 on 2/23/24 2/25-bedside clinical neurological exam consistent with loss of all brain function; however, when apnea testing attempted yesterday she became unstable and required reinitiation of mechanical ventilation.  CTA brain was performed as an ancillary test to brain death testing which demonstrated residual blood flow in the anterior and middle cerebral arteries.  Has been maintained on crystalloids, vasopressors, and mechanical ventilation overnight.      Assessment:  Nontraumatic intracranial hemorrhage-multicompartmental (right basal ganglia, intraventricular, subarachnoid) with midline shift and cerebral edema  Clinically brain-dead  Seizures-none noted since prior to admission  Acute respiratory failure due to ICH-, CXR consistent with volume overload, lines and tubes appropriately

## 2024-02-26 NOTE — DEATH NOTES
Death Pronouncement Note  Patient's Name: Gina Bedoya   Patient's YOB: 1970  MRN Number: [id number]    Admitting Provider: Kika Ku MD  Attending Provider:  Steve Rush MD     Patient was examined and the following were absent: Pulses, Blood Pressure, and Respiratory effort    I declared the patient dead on 2/26/2024 at 2:00 PM    Preliminary Cause of Death: Cardiopulmonary arrest (HCC)     Electronically signed by iKm Kincaid DO on 2/26/24 at 2:51 PM EST

## 2024-02-26 NOTE — PROGRESS NOTES
02/26/24 0839   Patient Observation   Pulse 66   Respirations 12   SpO2 100 %   Vent Information   Vent Mode AC/VC   Ventilator Settings   Vt (Set, mL) 450 mL   Resp Rate (Set) 12 bpm   PEEP/CPAP (cmH2O) 14   FiO2  90 %   Peak Inspiratory Flow (Set) 50 L/sec   Vent Patient Data (Readings)   Vt (Measured) 538 mL   Peak Inspiratory Pressure (cmH2O) 32 cmH2O   Rate Measured 12 br/min   Minute Volume (L/min) 6.18 Liters   Peak Inspiratory Flow (lpm) 50 L/sec   Mean Airway Pressure (cmH2O) 17 cmH20   Plateau Pressure (cm H2O) 25 cm H2O   Driving Pressure 11   I:E Ratio 1:4.10   Flow Sensitivity 3 L/min   Static Compliance (L/cm H2O) 48   Backup Apnea On   Backup Rate 12 Breaths Per Minute   Backup Vt 450   Backup I Time 20   Vent Alarm Settings   High Pressure (cmH2O) 45 cmH2O   Low Minute Volume (lpm) 4 L/min   High Minute Volume (lpm) 12 L/min   Low Exhaled Vt (ml) 350 mL   High Exhaled Vt (ml) 700 mL   RR High (bpm) 20 br/min   Apnea (secs) 10 secs   Additional Respiratoray Assessments   Humidification Source Heated wire   Humidification Temp 37   Circuit Condensation Drained   Ambu Bag With Mask At Bedside Yes   Airway Clearance   Suction ET Tube   Suction Device Inline suction catheter   Sputum Method Obtained Endotracheal   Sputum Amount Small   Sputum Color/Odor Tan   Sputum Consistency Thick   Non-Surgical Airway 02/21/24 Endotracheal tube   Placement Date/Time: 02/21/24 1533   Present on Admission/Arrival: Yes  Placed By: (c)   Airway Device: Endotracheal tube  Size: 7   Secured At 24 cm   Measured From Lips   Secured By Commercial tube augustin   Site Assessment Dry

## 2024-02-26 NOTE — PROGRESS NOTES
Attending Physician Statement:  I have examined the patient, reviewed the record, and discussed the case with the surgical team.  I agree with the assessment and plan with the following additions, corrections, and changes.    I provided critical care to a patient with ICH and acute respiratory failure  requiring frequent and emergent imaging, lab studies, intensive monitoring, data review, and adjusting the clinical plan as well as urgent coordination with multiple specialists.     2/22-admitted to SICU overnight after being found down and unresponsive.  Workup demonstrated large intracerebral hemorrhage.  She remains deeply comatose.  No eye-opening.  Decerebrate posturing.  Right pupil 3 mm nonreactive.  Left pupil 2 mm nonreactive.  Hemodynamics and oxygenation stable.  2/23--showed signs of herniation last night; started on pressors; propofol stopped at 0430; no response to pain; pupils 5 mm bilaterally nonreactive; no oculocephalic reflex; no cough or gag reflex; family at bedside  2/24--received desmopressin for central DI yesterday; remains unresponsive; off all sedation/narcotics/CNS depressants since 0430 on 2/23/24 2/25-bedside clinical neurological exam consistent with loss of all brain function; however, when apnea testing attempted yesterday she became unstable and required reinitiation of mechanical ventilation.  CTA brain was performed as an ancillary test to brain death testing which demonstrated residual blood flow in the anterior and middle cerebral arteries.  Has been maintained on crystalloids, vasopressors, and mechanical ventilation overnight.  2/26-patient does not breathe above the vent, currently off all sedation   pupils remain fixed and dilated  Assessment:  Nontraumatic intracranial hemorrhage-multicompartmental (right basal ganglia, intraventricular, subarachnoid) with midline shift and cerebral edema  Clinically brain-dead  Seizures-none noted since prior to admission  Acute

## 2024-02-26 NOTE — PLAN OF CARE
Problem: Discharge Planning  Goal: Discharge to home or other facility with appropriate resources  2/24/2024 0902 by Sheree Archer  Outcome: Progressing  2/23/2024 2050 by Vazquez Alberto RN  Outcome: Progressing     Problem: Pain  Goal: Verbalizes/displays adequate comfort level or baseline comfort level  2/24/2024 0902 by Sheree Archer  Outcome: Progressing  2/23/2024 2050 by Vazquez Alberto RN  Outcome: Progressing     Problem: Skin/Tissue Integrity  Goal: Absence of new skin breakdown  Description: 1.  Monitor for areas of redness and/or skin breakdown  2.  Assess vascular access sites hourly  3.  Every 4-6 hours minimum:  Change oxygen saturation probe site  4.  Every 4-6 hours:  If on nasal continuous positive airway pressure, respiratory therapy assess nares and determine need for appliance change or resting period.  2/24/2024 0902 by Sheree Archer  Outcome: Progressing  2/23/2024 2050 by Vazquez Alberto RN  Outcome: Progressing     Problem: Safety - Adult  Goal: Free from fall injury  2/24/2024 0902 by Sheree Archer  Outcome: Progressing  2/23/2024 2050 by Vazquez Alberto RN  Outcome: Progressing     Problem: ABCDS Injury Assessment  Goal: Absence of physical injury  2/24/2024 0902 by Sheree Archer  Outcome: Progressing  2/23/2024 2050 by Vazquez Alberto RN  Outcome: Progressing     Problem: Respiratory - Adult  Goal: Achieves optimal ventilation and oxygenation  2/24/2024 0902 by Sheree Archer  Outcome: Progressing  2/24/2024 0358 by Jillian Dickerson RCP  Outcome: Progressing  2/23/2024 2050 by Vazquez Alberto RN  Outcome: Progressing     
  Problem: Discharge Planning  Goal: Discharge to home or other facility with appropriate resources  2/24/2024 2025 by Vanessa Robbins RN  Flowsheets (Taken 2/21/2024 2000)  Discharge to home or other facility with appropriate resources: Identify barriers to discharge with patient and caregiver     Problem: Pain  Goal: Verbalizes/displays adequate comfort level or baseline comfort level  2/24/2024 2025 by Vanessa Robbins RN  Outcome: Progressing  Verbalizes/displays adequate comfort level or baseline comfort level:   Encourage patient to monitor pain and request assistance   Assess pain using appropriate pain scale   Administer analgesics based on type and severity of pain and evaluate response   Implement non-pharmacological measures as appropriate and evaluate response   Consider cultural and social influences on pain and pain management     Problem: Pain  Goal: Verbalizes/displays adequate comfort level or baseline comfort level  2/24/2024 2025 by Vanessa Robbins RN  Outcome: Progressing  Verbalizes/displays adequate comfort level or baseline comfort level:   Encourage patient to monitor pain and request assistance   Assess pain using appropriate pain scale   Administer analgesics based on type and severity of pain and evaluate response   Implement non-pharmacological measures as appropriate and evaluate response   Consider cultural and social influences on pain and pain management     Problem: Skin/Tissue Integrity  Goal: Absence of new skin breakdown  Description: 1.  Monitor for areas of redness and/or skin breakdown  2.  Assess vascular access sites hourly  3.  Every 4-6 hours minimum:  Change oxygen saturation probe site  4.  Every 4-6 hours:  If on nasal continuous positive airway pressure, respiratory therapy assess nares and determine need for appliance change or resting period.  2/24/2024 2025 by Vanessa Robbins RN  Outcome: Progressing     Problem: Safety - Adult  Goal: Free from fall 
  Problem: Discharge Planning  Goal: Discharge to home or other facility with appropriate resources  Outcome: Progressing     Problem: Pain  Goal: Verbalizes/displays adequate comfort level or baseline comfort level  2/22/2024 2141 by Vazquez Alberto RN  Outcome: Progressing  2/22/2024 1712 by Sahil Coreas RN  Outcome: Progressing     Problem: Skin/Tissue Integrity  Goal: Absence of new skin breakdown  Description: 1.  Monitor for areas of redness and/or skin breakdown  2.  Assess vascular access sites hourly  3.  Every 4-6 hours minimum:  Change oxygen saturation probe site  4.  Every 4-6 hours:  If on nasal continuous positive airway pressure, respiratory therapy assess nares and determine need for appliance change or resting period.  2/22/2024 2141 by Vazquez Alberto RN  Outcome: Progressing  2/22/2024 1712 by Sahil Coreas RN  Outcome: Progressing     Problem: Safety - Adult  Goal: Free from fall injury  2/22/2024 2141 by Vazquez Alberto RN  Outcome: Progressing  2/22/2024 1712 by Sahil Coreas RN  Outcome: Progressing     Problem: ABCDS Injury Assessment  Goal: Absence of physical injury  2/22/2024 2141 by Vazquez Alberto RN  Outcome: Progressing  2/22/2024 1712 by Sahil Coreas RN  Outcome: Progressing     Problem: Respiratory - Adult  Goal: Achieves optimal ventilation and oxygenation  2/22/2024 2141 by Vazquez Alberto RN  Outcome: Progressing  2/22/2024 1712 by Sahil Coreas RN  Outcome: Progressing  2/22/2024 0833 by Stephanie Poe RCP  Outcome: Progressing  Flowsheets (Taken 2/22/2024 0833)  Achieves optimal ventilation and oxygenation:   Assess for changes in respiratory status   Position to facilitate oxygenation and minimize respiratory effort   Assess the need for suctioning and aspirate as needed   Respiratory therapy support as indicated   Oxygen supplementation based on oxygen saturation or arterial blood gases  Note: Wean FiO2 as tolerated     
  Problem: Discharge Planning  Goal: Discharge to home or other facility with appropriate resources  Outcome: Progressing     Problem: Pain  Goal: Verbalizes/displays adequate comfort level or baseline comfort level  2/23/2024 2050 by Vazquez Alberto RN  Outcome: Progressing  2/23/2024 1112 by Sahil Coreas RN  Outcome: Progressing     Problem: Skin/Tissue Integrity  Goal: Absence of new skin breakdown  Description: 1.  Monitor for areas of redness and/or skin breakdown  2.  Assess vascular access sites hourly  3.  Every 4-6 hours minimum:  Change oxygen saturation probe site  4.  Every 4-6 hours:  If on nasal continuous positive airway pressure, respiratory therapy assess nares and determine need for appliance change or resting period.  2/23/2024 2050 by Vazquez Alberto RN  Outcome: Progressing  2/23/2024 1112 by Sahil Coreas RN  Outcome: Progressing     Problem: Safety - Adult  Goal: Free from fall injury  2/23/2024 2050 by Vazquez Alberto RN  Outcome: Progressing  2/23/2024 1112 by Sahil Coreas RN  Outcome: Progressing     Problem: ABCDS Injury Assessment  Goal: Absence of physical injury  2/23/2024 2050 by Vazquez Alberto RN  Outcome: Progressing  2/23/2024 1112 by Sahil Coreas RN  Outcome: Progressing     Problem: Respiratory - Adult  Goal: Achieves optimal ventilation and oxygenation  2/23/2024 2050 by Vazquez Alberto RN  Outcome: Progressing  2/23/2024 1112 by Sahil Coreas RN  Outcome: Progressing     
  Problem: Pain  Goal: Verbalizes/displays adequate comfort level or baseline comfort level  2/23/2024 1112 by Sahil Coreas, RN  Outcome: Progressing     Problem: Skin/Tissue Integrity  Goal: Absence of new skin breakdown  Description: 1.  Monitor for areas of redness and/or skin breakdown  2.  Assess vascular access sites hourly  3.  Every 4-6 hours minimum:  Change oxygen saturation probe site  4.  Every 4-6 hours:  If on nasal continuous positive airway pressure, respiratory therapy assess nares and determine need for appliance change or resting period.  2/23/2024 1112 by Sahil Coreas, RN  Outcome: Progressing     Problem: Safety - Adult  Goal: Free from fall injury  2/23/2024 1112 by Sahil Coreas, RN  Outcome: Progressing     Problem: ABCDS Injury Assessment  Goal: Absence of physical injury  2/23/2024 1112 by Sahil Coreas, RN  Outcome: Progressing     Problem: Respiratory - Adult  Goal: Achieves optimal ventilation and oxygenation  2/23/2024 1112 by Sahil Coreas, RN  Outcome: Progressing     
  Problem: Pain  Goal: Verbalizes/displays adequate comfort level or baseline comfort level  Outcome: Progressing     Problem: Skin/Tissue Integrity  Goal: Absence of new skin breakdown  Description: 1.  Monitor for areas of redness and/or skin breakdown  2.  Assess vascular access sites hourly  3.  Every 4-6 hours minimum:  Change oxygen saturation probe site  4.  Every 4-6 hours:  If on nasal continuous positive airway pressure, respiratory therapy assess nares and determine need for appliance change or resting period.  Outcome: Progressing     Problem: Safety - Adult  Goal: Free from fall injury  Outcome: Progressing     Problem: ABCDS Injury Assessment  Goal: Absence of physical injury  Outcome: Progressing     Problem: Respiratory - Adult  Goal: Achieves optimal ventilation and oxygenation  2/22/2024 1712 by Sahil Coreas RN  Outcome: Progressing     
  Problem: Respiratory - Adult  Goal: Achieves optimal ventilation and oxygenation  2/23/2024 0245 by Jillian Dickerson RCP  Outcome: Progressing     
  Problem: Respiratory - Adult  Goal: Achieves optimal ventilation and oxygenation  2/24/2024 0358 by Jillian Dickerson RCP  Outcome: Progressing     
  Problem: Respiratory - Adult  Goal: Achieves optimal ventilation and oxygenation  2/26/2024 0403 by Jillian Dickerson RCP  Outcome: Progressing     
  Problem: Respiratory - Adult  Goal: Achieves optimal ventilation and oxygenation  2/26/2024 0846 by Marlena Vazquez RCP  Outcome: Progressing     
  Problem: Skin/Tissue Integrity  Goal: Absence of new skin breakdown  Description: 1.  Monitor for areas of redness and/or skin breakdown  2.  Assess vascular access sites hourly  3.  Every 4-6 hours minimum:  Change oxygen saturation probe site  4.  Every 4-6 hours:  If on nasal continuous positive airway pressure, respiratory therapy assess nares and determine need for appliance change or resting period.  2/25/2024 2006 by Vanessa Robbins, RN  Outcome: Progressing     Problem: Safety - Adult  Goal: Free from fall injury  2/25/2024 2006 by Vanessa Robbins, RN  Outcome: Progressing  Flowsheets (Taken 2/21/2024 2000)  Free From Fall Injury: Instruct family/caregiver on patient safety     Problem: ABCDS Injury Assessment  Goal: Absence of physical injury  2/25/2024 2006 by Vanessa Robbins, RN  Outcome: Progressing  Flowsheets (Taken 2/24/2024 2000)  Absence of Physical Injury: Implement safety measures based on patient assessment     Problem: Respiratory - Adult  Goal: Achieves optimal ventilation and oxygenation  2/25/2024 2006 by Vanessa Robbins RN  Outcome: Progressing  Achieves optimal ventilation and oxygenation:   Assess for changes in respiratory status   Position to facilitate oxygenation and minimize respiratory effort   Assess the need for suctioning and aspirate as needed   Respiratory therapy support as indicated   Oxygen supplementation based on oxygen saturation or arterial blood gases     
Problem: Respiratory - Adult  Goal: Achieves optimal ventilation and oxygenation  Outcome: Progressing  Flowsheets (Taken 2/22/2024 9001)  Achieves optimal ventilation and oxygenation:   Assess for changes in respiratory status   Position to facilitate oxygenation and minimize respiratory effort   Assess the need for suctioning and aspirate as needed   Respiratory therapy support as indicated   Oxygen supplementation based on oxygen saturation or arterial blood gases  Note: Wean FiO2 as tolerated     
Monge, Guicho G, RN  Outcome: Progressing  2/24/2024 2025 by Vanessa Robbins RN  Outcome: Progressing  Flowsheets (Taken 2/24/2024 2000)  Absence of Physical Injury: Implement safety measures based on patient assessment     Problem: Respiratory - Adult  Goal: Achieves optimal ventilation and oxygenation  2/25/2024 0717 by Guicho Monge RN  Outcome: Progressing  2/24/2024 2025 by Vanessa Robbins RN  Outcome: Progressing  Flowsheets (Taken 2/22/2024 0833 by Stephanie Poe, [facility])  Achieves optimal ventilation and oxygenation:   Assess for changes in respiratory status   Position to facilitate oxygenation and minimize respiratory effort   Assess the need for suctioning and aspirate as needed   Respiratory therapy support as indicated   Oxygen supplementation based on oxygen saturation or arterial blood gases     
and caregiver  Outcome: Not Progressing     Problem: Pain  Goal: Verbalizes/displays adequate comfort level or baseline comfort level  2/21/2024 2155 by Vanessa Robbins RN  Outcome: Progressing  Verbalizes/displays adequate comfort level or baseline comfort level:   Encourage patient to monitor pain and request assistance   Assess pain using appropriate pain scale   Administer analgesics based on type and severity of pain and evaluate response   Implement non-pharmacological measures as appropriate and evaluate response   Consider cultural and social influences on pain and pain management  Outcome: Not Progressing

## 2024-02-26 NOTE — PROGRESS NOTES
Time of death/ Asystole 1401  Cross Clamp 1405  Extubated 1343  Liver out 1433  Right Kidney out 1438  Left Kidney out 1446  4 spleen biopsies 1449  Pancreas(for research) out 1516

## (undated) DEVICE — Device

## (undated) DEVICE — BAG PRSS INFUS 1000ML 2 PRSS SFTY VLV RIG HNGR SLIP EASILY

## (undated) DEVICE — COVER,TABLE,60X90,STERILE: Brand: MEDLINE

## (undated) DEVICE — SYRINGE MED 10ML POLYPR LUERSLIP TIP FLAT TOP W/O SFTY DISP

## (undated) DEVICE — TOWEL,OR,DSP,ST,BLUE,DLX,10/PK,8PK/CS: Brand: MEDLINE

## (undated) DEVICE — MAGNETIC INSTR DRAPE 20X16: Brand: MEDLINE INDUSTRIES, INC.

## (undated) DEVICE — ALCOHOL RUBBING ISO 16OZ 70%

## (undated) DEVICE — STRYKER PERFORMANCE SERIES STERNUM BLADE: Brand: STRYKER PERFORMANCE SERIES

## (undated) DEVICE — YANKAUER,POOLE TIP,STERILE,50/CS: Brand: MEDLINE

## (undated) DEVICE — SHEET,DRAPE,53X77,STERILE: Brand: MEDLINE

## (undated) DEVICE — YANKAUER,OPEN TIP,W/O VENT,STERILE: Brand: MEDLINE INDUSTRIES, INC.

## (undated) DEVICE — SOLUTION,WATER,IRRIGATION,500ML,STERILE: Brand: MEDLINE

## (undated) DEVICE — WAX SURG 2.5GM HEMSTAT BNE BEESWAX PARAFFIN ISO PALMITATE

## (undated) DEVICE — SYRINGE MED 10ML LUERLOCK TIP W/O SFTY DISP

## (undated) DEVICE — GOWN,BREATHABLE SLV,AURORA,XLG,STRL: Brand: MEDLINE

## (undated) DEVICE — APPLICATOR MEDICATED 26 CC SOLUTION HI LT ORNG CHLORAPREP

## (undated) DEVICE — TRAUMA: Brand: MEDLINE INDUSTRIES, INC.

## (undated) DEVICE — DECANTER BAG 9": Brand: MEDLINE INDUSTRIES, INC.